# Patient Record
Sex: MALE | Race: WHITE | NOT HISPANIC OR LATINO | Employment: FULL TIME | URBAN - METROPOLITAN AREA
[De-identification: names, ages, dates, MRNs, and addresses within clinical notes are randomized per-mention and may not be internally consistent; named-entity substitution may affect disease eponyms.]

---

## 2017-01-03 ENCOUNTER — CLINICAL SUPPORT (OUTPATIENT)
Dept: FAMILY MEDICINE CLINIC | Facility: CLINIC | Age: 62
End: 2017-01-03

## 2017-01-03 DIAGNOSIS — I82.4Y9 DEEP VEIN THROMBOSIS (DVT) OF PROXIMAL LOWER EXTREMITY, UNSPECIFIED CHRONICITY, UNSPECIFIED LATERALITY (HCC): Primary | ICD-10-CM

## 2017-01-03 LAB — INR PPP: 2.8 (ref 0.9–1.1)

## 2017-01-03 PROCEDURE — 36416 COLLJ CAPILLARY BLOOD SPEC: CPT | Performed by: INTERNAL MEDICINE

## 2017-01-03 PROCEDURE — 85610 PROTHROMBIN TIME: CPT | Performed by: INTERNAL MEDICINE

## 2017-01-05 ENCOUNTER — TREATMENT (OUTPATIENT)
Dept: PHYSICAL THERAPY | Facility: CLINIC | Age: 62
End: 2017-01-05

## 2017-01-05 DIAGNOSIS — S83.282D TEAR OF LATERAL MENISCUS OF LEFT KNEE, CURRENT, UNSPECIFIED TEAR TYPE, SUBSEQUENT ENCOUNTER: ICD-10-CM

## 2017-01-05 DIAGNOSIS — S83.242D TEAR OF MEDIAL MENISCUS OF LEFT KNEE, CURRENT, UNSPECIFIED TEAR TYPE, SUBSEQUENT ENCOUNTER: Primary | ICD-10-CM

## 2017-01-05 DIAGNOSIS — Z98.890 S/P LEFT KNEE ARTHROSCOPY: ICD-10-CM

## 2017-01-05 PROCEDURE — 97161 PT EVAL LOW COMPLEX 20 MIN: CPT | Performed by: PHYSICAL THERAPIST

## 2017-01-05 PROCEDURE — 97110 THERAPEUTIC EXERCISES: CPT | Performed by: PHYSICAL THERAPIST

## 2017-01-05 NOTE — PATIENT INSTRUCTIONS
Access Code: [email or web address]   URL: [email or web address]   Date: 01/05/2017   Prepared by: Madina Pro     Exercises   Long Sitting Quad Set - 10 reps - 10 hold - 1x daily   Supine Heel Slide with Strap - 10 reps - 2 sets - 1x daily   Supine Hamstring Stretch with Strap - 3 reps - 20 hold - 1x daily   Supine Active Straight Leg Raise - 10 reps - 2 sets - 1x daily   Sidelying Hip Abduction - 10 reps - 2 sets - 1x daily

## 2017-01-05 NOTE — PROGRESS NOTES
Physical Therapy Initial Evaluation      Patient Name: Herb Jack       Patient MRN: [id number]  : 1955  Physician:Elijah Monet MD  Date: 2017    Encounter Diagnoses   Name Primary?   • Tear of medial meniscus of left knee, current, unspecified tear type, subsequent encounter Yes   • Tear of lateral meniscus of left knee, current, unspecified tear type, subsequent encounter      History: Left knee medial/lateral meniscus tears, s/p left knee arthroscopy with menisectomy and plica excision on 2016.  Crutches for a week or so, currently ambulating without AD.  Pain ranges from 1/10 at rest to 4/10 with stair climbing.    Objective Testing: See flowsheet    THERAPY ASSESSMENT:  Herb Jack is a pleasant 61 y.o. year old male who presents with signs and symptoms consistent with diagnosis.  Subjective outcome measure indicates 35% perceived disability.  Patient is a  good candidate for skilled PT services in order to address impairments and facilitate return to normal daily activities including ADL's, work and recreational activities.        Functional Limitations: Walking, Complaints of Pain, Decreased Strength, Decreased ROM, Decreased ability to perform ADL's   Length of Therapy: 1 month   PT Frequency: PT 2x week   PT Interventions: Therapeutic exercise - AROM, Therapeutic exercise - stretching, Therapeutic exercise - strengthening, Manual Therapy, Bracing/Taping, Soft Tissue mobilization, Hot packs/ Moist heat, Cold packs, Electrical stimulation, Balance Training, Home Exercise Program   Patient Agrees with Plan of Care: Yes    REHAB POTENTIAL: good            Short Term Goals: 4 weeks. Patient will:  1. Be independent with initial HEP  2. Be instructed in posture and body mechanics  3. Report pain of </= 2/10 with all daily activities  4. Demonstrate Left knee ROM 0-130 degrees for improved ADL's and stair climbing.  5. Demonstrate normal patellar mobility    Long Term  Goals: 6-8 weeks. Patient will:  1. Demonstrate improved Left lower extremity MMT of >/= 4+/5  2. Report pain of </= 1/10 with all daily activities  3. Ambulate with normal symmetrical gait  4. LEFS >/= 75/80  5. Be independent with long term HEP  6. Return to recreational activities including golf as desired.    Therapy Exercise 75391 25 minutes    Timed Code Treatment: 25   Minutes     Total Treatment Time: 60      Minutes    PT SIGNATURE: Madina Pro, PT   DATE TREATMENT INITIATED: 1/5/2017    Initial Certification Certification Period: 2/4/2017  I certify that the therapy services are furnished while this patient is under my care.  The services outlined above are required by this patient, and will be reviewed every 30 days.     PHYSICIAN: Elijah Monet MD      DATE:     Please sign and return via fax to [phone].. Thank you, [facility].

## 2017-01-09 ENCOUNTER — TREATMENT (OUTPATIENT)
Dept: PHYSICAL THERAPY | Facility: CLINIC | Age: 62
End: 2017-01-09

## 2017-01-09 DIAGNOSIS — Z98.890 S/P LEFT KNEE ARTHROSCOPY: ICD-10-CM

## 2017-01-09 DIAGNOSIS — S83.242D TEAR OF MEDIAL MENISCUS OF LEFT KNEE, CURRENT, UNSPECIFIED TEAR TYPE, SUBSEQUENT ENCOUNTER: Primary | ICD-10-CM

## 2017-01-09 DIAGNOSIS — S83.282D TEAR OF LATERAL MENISCUS OF LEFT KNEE, CURRENT, UNSPECIFIED TEAR TYPE, SUBSEQUENT ENCOUNTER: ICD-10-CM

## 2017-01-09 PROCEDURE — 97110 THERAPEUTIC EXERCISES: CPT | Performed by: PHYSICAL THERAPIST

## 2017-01-09 NOTE — PROGRESS NOTES
Daily Progress Note      Subjective   Knee felt good after first PT session.  Still with pain with stair climbing.    Pain Scale (0-10): 1/10      Objective          PROCEDURES AND MODALITIES:  Paraffin:    Moist Heat:    Ice: Rx Minutes: 10 mins  E-Stim:    Ultrasound:    Ionto:   Traction:        Therapy Exercise 35099 50 minutes     Timed Code Treatment: 50 Minutes  Total Treatment Time: 60 Minutes    Assessment/Plan   Tolerated new exercises well.  Mild pain with squat, otherwise pain free.  Progress per Plan of Care         Madina Pro, PT  Physical Therapist

## 2017-01-11 ENCOUNTER — TREATMENT (OUTPATIENT)
Dept: PHYSICAL THERAPY | Facility: CLINIC | Age: 62
End: 2017-01-11

## 2017-01-11 DIAGNOSIS — S83.242D TEAR OF MEDIAL MENISCUS OF LEFT KNEE, CURRENT, UNSPECIFIED TEAR TYPE, SUBSEQUENT ENCOUNTER: Primary | ICD-10-CM

## 2017-01-11 DIAGNOSIS — S83.282D TEAR OF LATERAL MENISCUS OF LEFT KNEE, CURRENT, UNSPECIFIED TEAR TYPE, SUBSEQUENT ENCOUNTER: ICD-10-CM

## 2017-01-11 DIAGNOSIS — Z98.890 S/P LEFT KNEE ARTHROSCOPY: ICD-10-CM

## 2017-01-11 PROCEDURE — 97110 THERAPEUTIC EXERCISES: CPT | Performed by: PHYSICAL THERAPIST

## 2017-01-11 NOTE — PROGRESS NOTES
Daily Progress Note      Subjective   Pt states he has been focusing more on heel strike while walking, and this has greatly improved his pain.    Pain Scale (0-10): 1/10      Objective          PROCEDURES AND MODALITIES:  Paraffin:    Moist Heat:    Ice: Rx Minutes: 10 mins  E-Stim:    Ultrasound:    Ionto:   Traction:        Therapy Exercise 93405 45 minutes     Timed Code Treatment: 45 Minutes  Total Treatment Time: 55 Minutes    Assessment/Plan   Tolerated new exercises well without pain.    Progress per Plan of Care         Madina Pro, PT  Physical Therapist

## 2017-01-16 ENCOUNTER — TREATMENT (OUTPATIENT)
Dept: PHYSICAL THERAPY | Facility: CLINIC | Age: 62
End: 2017-01-16

## 2017-01-16 DIAGNOSIS — S83.282D TEAR OF LATERAL MENISCUS OF LEFT KNEE, CURRENT, UNSPECIFIED TEAR TYPE, SUBSEQUENT ENCOUNTER: ICD-10-CM

## 2017-01-16 DIAGNOSIS — Z98.890 S/P LEFT KNEE ARTHROSCOPY: ICD-10-CM

## 2017-01-16 DIAGNOSIS — S83.242D TEAR OF MEDIAL MENISCUS OF LEFT KNEE, CURRENT, UNSPECIFIED TEAR TYPE, SUBSEQUENT ENCOUNTER: Primary | ICD-10-CM

## 2017-01-16 PROCEDURE — 97110 THERAPEUTIC EXERCISES: CPT | Performed by: PHYSICAL THERAPIST

## 2017-01-16 NOTE — PROGRESS NOTES
Daily Progress Note      Subjective   Knee is feeling good.  Asks if he can use his elliptical at home    Pain Scale (0-10): 1/10      Objective          PROCEDURES AND MODALITIES:  Paraffin:    Moist Heat:    Ice: Rx Minutes: 10 mins  E-Stim:    Ultrasound:    Ionto:   Traction:        Therapy Exercise 69836 50 minutes     Timed Code Treatment: 50 Minutes  Total Treatment Time: 60 Minutes    Assessment/Plan   Tolerated new exercises well.  Good control of knee on elliptical, OK to do this at home.  Progress per Plan of Care         Madina Pro, PT  Physical Therapist

## 2017-01-17 ENCOUNTER — CLINICAL SUPPORT (OUTPATIENT)
Dept: FAMILY MEDICINE CLINIC | Facility: CLINIC | Age: 62
End: 2017-01-17

## 2017-01-17 DIAGNOSIS — I82.409 THROMBOEMBOLISM OF DEEP VEINS OF LOWER EXTREMITY, UNSPECIFIED LATERALITY (HCC): Primary | ICD-10-CM

## 2017-01-17 LAB — INR PPP: 2.3 (ref 0.9–1.1)

## 2017-01-17 PROCEDURE — 36416 COLLJ CAPILLARY BLOOD SPEC: CPT | Performed by: INTERNAL MEDICINE

## 2017-01-17 PROCEDURE — 85610 PROTHROMBIN TIME: CPT | Performed by: INTERNAL MEDICINE

## 2017-01-17 NOTE — MR AVS SNAPSHOT
Herb Jack   1/17/2017 8:15 AM   Clinical Support    Dept Phone:  [phone]   Encounter #:  [id number]    Provider:  NURSELIZZIE PATEL   Department:  [facility] PRIMARY CARE                Your Full Care Plan              Your Updated Medication List          This list is accurate as of: 1/17/17  8:41 AM.  Always use your most recent med list.                * enoxaparin 100 MG/ML solution syringe   Commonly known as:  LOVENOX   Inject 0.3 mL under the skin Every 12 (Twelve) Hours.       * enoxaparin 30 MG/0.3ML solution syringe   Commonly known as:  LOVENOX   Inject 0.3 mL under the skin Every 12 (Twelve) Hours.       MULTI COMPLETE PO       omeprazole 40 MG capsule   Commonly known as:  priLOSEC   Take 1 capsule (40 mg total) by mouth daily.       oxyCODONE-acetaminophen 5-325 MG per tablet   Commonly known as:  PERCOCET   Take 1-2 tablets by mouth Every 4 (Four) Hours As Needed (pain). 1 tab for mild pain, 2 tabs for moderate pain       tadalafil 20 MG tablet   Commonly known as:  CIALIS       * warfarin 7.5 MG tablet   Commonly known as:  COUMADIN       * warfarin 5 MG tablet   Commonly known as:  COUMADIN       * Notice:  This list has 4 medication(s) that are the same as other medications prescribed for you. Read the directions carefully, and ask your doctor or other care provider to review them with you.            We Performed the Following     POC INR       You Were Diagnosed With        Codes Comments    Thromboembolism of deep veins of lower extremity, unspecified laterality    -  Primary ICD-10-CM: I82.409  ICD-9-CM: 453.40       Instructions     None    Patient Instructions History      Upcoming Appointments     Visit Type Date Time Department    NURSE/MA VISIT 1/17/2017  8:15 AM [facility]    COMM - FOLLOWUP PT 1/18/2017  7:00 AM [facility]    COMM - FOLLOWUP PT 1/23/2017  7:00 AM [facility] ESTPT    COMM - FOLLOWUP PT 1/30/2017   7:00 AM [facility] ESTPT    FOLLOW UP 2/1/2017  8:00 AM [facility]    COMM - FOLLOWUP PT 2/2/2017  7:00 AM [facility] ESTPT      MyChart Signup     Our records indicate that you have declined [facility] MyChart signup. If you would like to sign up for MyChart, please email [email or web address] or call [phone] to obtain an activation code.             Other Info from Your Visit           Your Appointments     Jan 18, 2017  7:00 AM EST   COMM FOLLOWUP PT with Madina Pro, PT   [facility] PHYSICAL THERAPY (--)    [address] [facility] [address]  [facility] [address] [phone]            Jan 23, 2017  7:00 AM EST   COMM FOLLOWUP PT with Madina Pro, PT   [facility] PHYSICAL [facility]ERAP[facility] (--)    [address] [facility] [address]  [facility] [address] [phone]            Jan 30, 2017  7:00 AM EST   COMM FOLLOWUP PT with Madina Pro, PT   [facility] (--)    [address]  [facility] [address] [phone]            Feb 01, 2017  8:00 AM EST   Follow Up with Elijah Monet MD   [facility] (--)    [address]  [place] [id number] [phone]           Arrive 15 minutes prior to appointment.            Feb 02, 2017  7:00 AM EST   COMM FOLLOWUP PT with Madina Pro, PT   [facility] (--)    [address]  [facility] [address] [phone]              Allergies     No Known Allergies      Vital Signs     Smoking Status                   Never Smoker           Problems and Diagnoses Noted     Blood clot in leg    -  Primary      Results

## 2017-01-18 ENCOUNTER — TREATMENT (OUTPATIENT)
Dept: PHYSICAL THERAPY | Facility: CLINIC | Age: 62
End: 2017-01-18

## 2017-01-18 DIAGNOSIS — Z98.890 S/P LEFT KNEE ARTHROSCOPY: ICD-10-CM

## 2017-01-18 DIAGNOSIS — S83.282D TEAR OF LATERAL MENISCUS OF LEFT KNEE, CURRENT, UNSPECIFIED TEAR TYPE, SUBSEQUENT ENCOUNTER: ICD-10-CM

## 2017-01-18 DIAGNOSIS — S83.242D TEAR OF MEDIAL MENISCUS OF LEFT KNEE, CURRENT, UNSPECIFIED TEAR TYPE, SUBSEQUENT ENCOUNTER: Primary | ICD-10-CM

## 2017-01-18 PROCEDURE — 97110 THERAPEUTIC EXERCISES: CPT | Performed by: PHYSICAL THERAPIST

## 2017-01-18 NOTE — PROGRESS NOTES
"Daily Progress Note      Subjective   Knee \"feels good\".  Has been using elliptical at home.  Stair climbing is much easier.    Pain Scale (0-10): 0/10      Objective          PROCEDURES AND MODALITIES:  Paraffin:    Moist Heat:    Ice: Rx Minutes: 10 mins  E-Stim:    Ultrasound:    Ionto:   Traction:        Therapy Exercise 19762 45 minutes     Timed Code Treatment: 45 Minutes  Total Treatment Time: 55 Minutes    Assessment/Plan   Quad and glut strength continues to improve.  Progress per Plan of Care         Madina Pro, PT  Physical Therapist        "

## 2017-01-23 ENCOUNTER — TREATMENT (OUTPATIENT)
Dept: PHYSICAL THERAPY | Facility: CLINIC | Age: 62
End: 2017-01-23

## 2017-01-23 DIAGNOSIS — S83.282D TEAR OF LATERAL MENISCUS OF LEFT KNEE, CURRENT, UNSPECIFIED TEAR TYPE, SUBSEQUENT ENCOUNTER: ICD-10-CM

## 2017-01-23 DIAGNOSIS — Z98.890 S/P LEFT KNEE ARTHROSCOPY: ICD-10-CM

## 2017-01-23 DIAGNOSIS — S83.242D TEAR OF MEDIAL MENISCUS OF LEFT KNEE, CURRENT, UNSPECIFIED TEAR TYPE, SUBSEQUENT ENCOUNTER: Primary | ICD-10-CM

## 2017-01-23 PROCEDURE — 97110 THERAPEUTIC EXERCISES: CPT | Performed by: PHYSICAL THERAPIST

## 2017-01-23 NOTE — PROGRESS NOTES
" Physical Therapy Daily Progress Note    Time In 0702  Time Out 0805    Herb Jack reports: was on his feet for hours at a time over the weekend, knee did \"pretty well\"    Subjective     Objective   See Exercise, Manual, and Modality Logs for complete treatment.       Assessment/Plan  Hip stability, quad strength continue to improve  Progress per Plan of Care           Manual Therapy:         mins  31296;  Therapeutic Exercise:    50     mins  81896;     Neuromuscular Ryan:        mins  70641;    Therapeutic Activity:          mins  93590;     Gait Training:           mins  17603;     Ultrasound:          mins  76396;    Electrical Stimulation:         mins  16570 ( );  Dry Needling          mins self-pay    Timed Treatment:   50   mins   Total Treatment:     60   mins    Madina Pro PT  Physical Therapist  [place] License #[id number]                "

## 2017-01-30 ENCOUNTER — TREATMENT (OUTPATIENT)
Dept: PHYSICAL THERAPY | Facility: CLINIC | Age: 62
End: 2017-01-30

## 2017-01-30 DIAGNOSIS — Z98.890 S/P LEFT KNEE ARTHROSCOPY: ICD-10-CM

## 2017-01-30 DIAGNOSIS — S83.282D TEAR OF LATERAL MENISCUS OF LEFT KNEE, CURRENT, UNSPECIFIED TEAR TYPE, SUBSEQUENT ENCOUNTER: ICD-10-CM

## 2017-01-30 DIAGNOSIS — S83.242D TEAR OF MEDIAL MENISCUS OF LEFT KNEE, CURRENT, UNSPECIFIED TEAR TYPE, SUBSEQUENT ENCOUNTER: Primary | ICD-10-CM

## 2017-01-30 PROCEDURE — 97110 THERAPEUTIC EXERCISES: CPT | Performed by: PHYSICAL THERAPIST

## 2017-01-30 RX ORDER — TADALAFIL 20 MG
TABLET ORAL
Qty: 3 TABLET | Refills: 1 | Status: SHIPPED | OUTPATIENT
Start: 2017-01-30 | End: 2017-05-09 | Stop reason: SDUPTHER

## 2017-01-30 NOTE — PROGRESS NOTES
" Physical Therapy Daily Progress Note    Subjective     Herb Jack reports: knee \"did well\" while playing golf.  Avoided a lot of rotation \"just to be cautious\"      Objective     Active Range of Motion   Left Knee   Flexion: 134 degrees   Extension: 0 degrees     Strength/Myotome Testing     Left Knee   Flexion: 5  Extension: 5  Quadriceps contraction: good     See Exercise, Manual, and Modality Logs for complete treatment.       Assessment/Plan  Pt requires cueing to correct quad dominant squat, but is able to perform squats without pain when form is corrected.  Progress per Plan of Care           Manual Therapy:    0     mins  65887;  Therapeutic Exercise:    50     mins  54387;     Neuromuscular Ryan:    0    mins  48328;    Therapeutic Activity:     0     mins  82953;     Gait Trainin     mins  44698;     Ultrasound:     0     mins  94073;    Electrical Stimulation:    0     mins  83937 ( );    Timed Treatment:   50   mins   Total Treatment:     60   mins    Madina Pro, PT  Physical Therapist  [place] License #[id number]                    "

## 2017-02-01 ENCOUNTER — OFFICE VISIT (OUTPATIENT)
Dept: ORTHOPEDIC SURGERY | Facility: CLINIC | Age: 62
End: 2017-02-01

## 2017-02-01 VITALS — BODY MASS INDEX: 33.32 KG/M2 | HEIGHT: 72 IN | WEIGHT: 246 LBS

## 2017-02-01 DIAGNOSIS — M25.512 CHRONIC LEFT SHOULDER PAIN: ICD-10-CM

## 2017-02-01 DIAGNOSIS — S83.242D TEAR OF MEDIAL MENISCUS OF LEFT KNEE, CURRENT, UNSPECIFIED TEAR TYPE, SUBSEQUENT ENCOUNTER: ICD-10-CM

## 2017-02-01 DIAGNOSIS — S83.282D TEAR OF LATERAL MENISCUS OF LEFT KNEE, CURRENT, UNSPECIFIED TEAR TYPE, SUBSEQUENT ENCOUNTER: Primary | ICD-10-CM

## 2017-02-01 DIAGNOSIS — G89.29 CHRONIC LEFT SHOULDER PAIN: ICD-10-CM

## 2017-02-01 PROCEDURE — 99024 POSTOP FOLLOW-UP VISIT: CPT | Performed by: ORTHOPAEDIC SURGERY

## 2017-02-01 NOTE — PROGRESS NOTES
"Knee Exam      Patient: Herb Jack    YOB: 1955 61 y.o. male    Chief Complaints: knee doing well    History of Present Illness: Follows up left knee scope with medial and lateral meniscal debridement from 12/13/16.  He is finishing physical therapy and overall feels like the knee is much much better not having any further mechanical symptoms and really no significant pain.  He played golf 4 days in a row last week.  He reports an injury to his left shoulder about a year ago putting some luggage up into a overhead bin.  He saw his primary care physician and responded well with physical therapy since he is completed physical therapy has not been doing home exercises he reports some mild intermittent achiness and stiffness in the shoulder and limitation on motion of internal rotation.  HPI    ROS: Shoulder pain  Past Medical History   Diagnosis Date   • Anticoagulated      WARFARIN STOPPED 12-8-16   • DVT (deep venous thrombosis)      LEFT LEG 2 YRS AGO AFTER TRAVELING   • Factor 5 Leiden mutation, heterozygous        Physical Exam:   Vitals:    02/01/17 0819   Weight: 246 lb (112 kg)   Height: 72\" (182.9 cm)     Well developed with normal mood.  Nonantalgic gait.  Left knee incisions are well healed no significant effusion warmth or erythema full extension 115° of flexion.  Calf and thigh are nontender without sign of DVT.  No pain with Tito's.  Left shoulder shows symmetric forward flexion and abduction with 5 out of 5 rotator cuff strength mildly limited internal rotation to midline of lumbar spine compared with just passed lumbar spine on the contralateral side.      Radiology: None performed      Assessment/Plan: Status post left knee scope with meniscal debridement I again reviewed operative pictures with him in detail he will continue with home physical therapy exercises for this and avoid jumping and impact activity or repetitive twisting.  He may play golf in elliptical and may " advance activities slowly as tolerated and I expect he should be able to go skiing next year.  He told me how much he appreciates my care and I'll see him back as needed.  2.  Regarding his left shoulder does have some mild limitation on internal rotation but no obvious evidence of rotator cuff tear today.  We decided to hold off on x-rays today.  As he improved with physical therapy in the past he will resume his home physical therapy exercises for this if it is not improved he will let me know and we'll get an MRI of the shoulder specialists.

## 2017-02-02 ENCOUNTER — TREATMENT (OUTPATIENT)
Dept: PHYSICAL THERAPY | Facility: CLINIC | Age: 62
End: 2017-02-02

## 2017-02-02 DIAGNOSIS — S83.242D TEAR OF MEDIAL MENISCUS OF LEFT KNEE, CURRENT, UNSPECIFIED TEAR TYPE, SUBSEQUENT ENCOUNTER: Primary | ICD-10-CM

## 2017-02-02 DIAGNOSIS — S83.282D TEAR OF LATERAL MENISCUS OF LEFT KNEE, CURRENT, UNSPECIFIED TEAR TYPE, SUBSEQUENT ENCOUNTER: ICD-10-CM

## 2017-02-02 DIAGNOSIS — Z98.890 S/P LEFT KNEE ARTHROSCOPY: ICD-10-CM

## 2017-02-02 PROCEDURE — 97110 THERAPEUTIC EXERCISES: CPT | Performed by: PHYSICAL THERAPIST

## 2017-02-02 NOTE — PATIENT INSTRUCTIONS
Access Code: [email or web address]   URL: [email or web address]   Date: 02/02/2017   Prepared by: Madina Pro     Exercises   Long Sitting Quad Set - 10 reps - 10 hold - 1x daily   Supine Heel Slide with Strap - 10 reps - 2 sets - 1x daily   Supine Hamstring Stretch with Strap - 3 reps - 20 hold - 1x daily   Supine Active Straight Leg Raise - 10 reps - 2 sets - 1x daily   Sidelying Hip Abduction - 10 reps - 2 sets - 1x daily   Forward Backward Monster Walk with Band at Thighs and Counter Support - 10 reps - 2 sets - 3x weekly   Sidestepping in Squat with Resistance and Arms Forward - 10 reps - 2 sets - 3x weekly   Step Up - 10 reps - 2 sets - 1x daily   Lateral Step Up - 10 reps - 2 sets - 3x weekly   Squat - 10 reps - 2 sets - 3x weekly

## 2017-02-07 ENCOUNTER — CLINICAL SUPPORT (OUTPATIENT)
Dept: FAMILY MEDICINE CLINIC | Facility: CLINIC | Age: 62
End: 2017-02-07

## 2017-02-07 DIAGNOSIS — I82.409 THROMBOEMBOLISM OF DEEP VEINS OF LOWER EXTREMITY, UNSPECIFIED LATERALITY (HCC): Primary | ICD-10-CM

## 2017-02-07 LAB — INR PPP: 2.2 (ref 0.9–1.1)

## 2017-02-07 PROCEDURE — 36416 COLLJ CAPILLARY BLOOD SPEC: CPT | Performed by: INTERNAL MEDICINE

## 2017-02-07 PROCEDURE — 85610 PROTHROMBIN TIME: CPT | Performed by: FAMILY MEDICINE

## 2017-03-07 DIAGNOSIS — I82.4Z9 DEEP VEIN THROMBOSIS (DVT) OF DISTAL VEIN OF LOWER EXTREMITY, UNSPECIFIED CHRONICITY, UNSPECIFIED LATERALITY (HCC): Primary | ICD-10-CM

## 2017-03-07 LAB — INR PPP: 2 (ref 0.9–1.1)

## 2017-03-07 PROCEDURE — 36416 COLLJ CAPILLARY BLOOD SPEC: CPT | Performed by: INTERNAL MEDICINE

## 2017-03-07 PROCEDURE — 85610 PROTHROMBIN TIME: CPT | Performed by: INTERNAL MEDICINE

## 2017-04-05 ENCOUNTER — CLINICAL SUPPORT (OUTPATIENT)
Dept: FAMILY MEDICINE CLINIC | Facility: CLINIC | Age: 62
End: 2017-04-05

## 2017-04-05 DIAGNOSIS — I82.90 DEEP VEIN THROMBOSIS (DVT) OF NON-EXTREMITY VEIN, UNSPECIFIED CHRONICITY: Primary | ICD-10-CM

## 2017-04-05 LAB — INR PPP: 1.7 (ref 0.9–1.1)

## 2017-04-05 PROCEDURE — 36416 COLLJ CAPILLARY BLOOD SPEC: CPT | Performed by: INTERNAL MEDICINE

## 2017-04-05 PROCEDURE — 85610 PROTHROMBIN TIME: CPT | Performed by: INTERNAL MEDICINE

## 2017-04-12 ENCOUNTER — CLINICAL SUPPORT (OUTPATIENT)
Dept: FAMILY MEDICINE CLINIC | Facility: CLINIC | Age: 62
End: 2017-04-12

## 2017-04-12 DIAGNOSIS — D68.51 FACTOR V LEIDEN (HCC): Primary | ICD-10-CM

## 2017-04-12 DIAGNOSIS — I48.91 ATRIAL FIBRILLATION, UNSPECIFIED TYPE (HCC): Primary | ICD-10-CM

## 2017-04-12 LAB
INR PPP: 1.8 (ref 0.9–1.1)
INR PPP: 1.8 (ref 0.9–1.1)

## 2017-04-12 PROCEDURE — 85610 PROTHROMBIN TIME: CPT | Performed by: INTERNAL MEDICINE

## 2017-04-12 PROCEDURE — 36416 COLLJ CAPILLARY BLOOD SPEC: CPT | Performed by: INTERNAL MEDICINE

## 2017-04-26 DIAGNOSIS — I82.4Z9 DEEP VEIN THROMBOSIS (DVT) OF DISTAL VEIN OF LOWER EXTREMITY, UNSPECIFIED CHRONICITY, UNSPECIFIED LATERALITY (HCC): Primary | ICD-10-CM

## 2017-04-26 LAB — INR PPP: 3.4 (ref 0.9–1.1)

## 2017-04-26 PROCEDURE — 36416 COLLJ CAPILLARY BLOOD SPEC: CPT | Performed by: INTERNAL MEDICINE

## 2017-04-26 PROCEDURE — 85610 PROTHROMBIN TIME: CPT | Performed by: INTERNAL MEDICINE

## 2017-04-26 RX ORDER — WARFARIN SODIUM 5 MG/1
5 TABLET ORAL
Start: 2017-04-26 | End: 2017-08-15 | Stop reason: SDUPTHER

## 2017-05-04 DIAGNOSIS — I82.4Z9 DEEP VEIN THROMBOSIS (DVT) OF DISTAL VEIN OF LOWER EXTREMITY, UNSPECIFIED CHRONICITY, UNSPECIFIED LATERALITY (HCC): Primary | ICD-10-CM

## 2017-05-04 LAB — INR PPP: 2.2 (ref 0.9–1.1)

## 2017-05-04 PROCEDURE — 36416 COLLJ CAPILLARY BLOOD SPEC: CPT | Performed by: INTERNAL MEDICINE

## 2017-05-04 PROCEDURE — 85610 PROTHROMBIN TIME: CPT | Performed by: INTERNAL MEDICINE

## 2017-05-10 RX ORDER — TADALAFIL 20 MG
TABLET ORAL
Qty: 3 TABLET | Refills: 0 | Status: SHIPPED | OUTPATIENT
Start: 2017-05-10 | End: 2017-08-10 | Stop reason: SDUPTHER

## 2017-08-10 ENCOUNTER — OFFICE VISIT (OUTPATIENT)
Dept: FAMILY MEDICINE CLINIC | Facility: CLINIC | Age: 62
End: 2017-08-10

## 2017-08-10 VITALS
HEIGHT: 72 IN | SYSTOLIC BLOOD PRESSURE: 124 MMHG | TEMPERATURE: 97.7 F | OXYGEN SATURATION: 96 % | WEIGHT: 245.1 LBS | RESPIRATION RATE: 16 BRPM | HEART RATE: 55 BPM | DIASTOLIC BLOOD PRESSURE: 78 MMHG | BODY MASS INDEX: 33.2 KG/M2

## 2017-08-10 DIAGNOSIS — I82.409 DEEP VEIN THROMBOSIS (DVT) OF LOWER EXTREMITY, UNSPECIFIED CHRONICITY, UNSPECIFIED LATERALITY, UNSPECIFIED VEIN (HCC): Primary | ICD-10-CM

## 2017-08-10 DIAGNOSIS — G89.29 CHRONIC LEFT SHOULDER PAIN: ICD-10-CM

## 2017-08-10 DIAGNOSIS — Z12.5 PROSTATE CANCER SCREENING: ICD-10-CM

## 2017-08-10 DIAGNOSIS — M25.512 CHRONIC LEFT SHOULDER PAIN: ICD-10-CM

## 2017-08-10 DIAGNOSIS — Z00.00 HEALTH CARE MAINTENANCE: Primary | ICD-10-CM

## 2017-08-10 LAB
ALBUMIN SERPL-MCNC: 4.2 G/DL (ref 3.5–5.2)
ALBUMIN/GLOB SERPL: 1.4 G/DL
ALP SERPL-CCNC: 52 U/L (ref 39–117)
ALT SERPL-CCNC: 23 U/L (ref 1–41)
AST SERPL-CCNC: 24 U/L (ref 1–40)
BASOPHILS # BLD AUTO: 0.05 10*3/MM3 (ref 0–0.2)
BASOPHILS NFR BLD AUTO: 0.8 % (ref 0–1.5)
BILIRUB SERPL-MCNC: 0.4 MG/DL (ref 0.1–1.2)
BUN SERPL-MCNC: 13 MG/DL (ref 8–23)
BUN/CREAT SERPL: 12.9 (ref 7–25)
CALCIUM SERPL-MCNC: 9.9 MG/DL (ref 8.6–10.5)
CHLORIDE SERPL-SCNC: 105 MMOL/L (ref 98–107)
CHOLEST SERPL-MCNC: 269 MG/DL (ref 0–200)
CO2 SERPL-SCNC: 27.6 MMOL/L (ref 22–29)
CREAT SERPL-MCNC: 1.01 MG/DL (ref 0.76–1.27)
EOSINOPHIL # BLD AUTO: 0.32 10*3/MM3 (ref 0–0.7)
EOSINOPHIL NFR BLD AUTO: 5.4 % (ref 0.3–6.2)
ERYTHROCYTE [DISTWIDTH] IN BLOOD BY AUTOMATED COUNT: 14.1 % (ref 11.5–14.5)
GLOBULIN SER CALC-MCNC: 3 GM/DL
GLUCOSE SERPL-MCNC: 107 MG/DL (ref 65–99)
HCT VFR BLD AUTO: 47.8 % (ref 40.4–52.2)
HDLC SERPL-MCNC: 51 MG/DL (ref 40–60)
HGB BLD-MCNC: 15.5 G/DL (ref 13.7–17.6)
IMM GRANULOCYTES # BLD: 0 10*3/MM3 (ref 0–0.03)
IMM GRANULOCYTES NFR BLD: 0 % (ref 0–0.5)
INR PPP: 2.2 (ref 0.9–1.1)
LDLC SERPL CALC-MCNC: 176 MG/DL (ref 0–100)
LYMPHOCYTES # BLD AUTO: 1.94 10*3/MM3 (ref 0.9–4.8)
LYMPHOCYTES NFR BLD AUTO: 32.8 % (ref 19.6–45.3)
MCH RBC QN AUTO: 31.5 PG (ref 27–32.7)
MCHC RBC AUTO-ENTMCNC: 32.4 G/DL (ref 32.6–36.4)
MCV RBC AUTO: 97.2 FL (ref 79.8–96.2)
MONOCYTES # BLD AUTO: 0.36 10*3/MM3 (ref 0.2–1.2)
MONOCYTES NFR BLD AUTO: 6.1 % (ref 5–12)
NEUTROPHILS # BLD AUTO: 3.25 10*3/MM3 (ref 1.9–8.1)
NEUTROPHILS NFR BLD AUTO: 54.9 % (ref 42.7–76)
PLATELET # BLD AUTO: 213 10*3/MM3 (ref 140–500)
POTASSIUM SERPL-SCNC: 4.4 MMOL/L (ref 3.5–5.2)
PROT SERPL-MCNC: 7.2 G/DL (ref 6–8.5)
PSA SERPL-MCNC: 2.1 NG/ML (ref 0–4)
RBC # BLD AUTO: 4.92 10*6/MM3 (ref 4.6–6)
SODIUM SERPL-SCNC: 143 MMOL/L (ref 136–145)
T4 FREE SERPL-MCNC: 0.98 NG/DL (ref 0.93–1.7)
TRIGL SERPL-MCNC: 209 MG/DL (ref 0–150)
TSH SERPL DL<=0.005 MIU/L-ACNC: 2.72 MIU/ML (ref 0.27–4.2)
VLDLC SERPL CALC-MCNC: 41.8 MG/DL (ref 5–40)
WBC # BLD AUTO: 5.92 10*3/MM3 (ref 4.5–10.7)

## 2017-08-10 PROCEDURE — 99396 PREV VISIT EST AGE 40-64: CPT | Performed by: INTERNAL MEDICINE

## 2017-08-10 PROCEDURE — 36416 COLLJ CAPILLARY BLOOD SPEC: CPT | Performed by: INTERNAL MEDICINE

## 2017-08-10 PROCEDURE — 85610 PROTHROMBIN TIME: CPT | Performed by: INTERNAL MEDICINE

## 2017-08-10 RX ORDER — TADALAFIL 20 MG/1
20 TABLET ORAL DAILY PRN
Qty: 3 TABLET | Refills: 11 | Status: SHIPPED | OUTPATIENT
Start: 2017-08-10 | End: 2018-01-08 | Stop reason: SDUPTHER

## 2017-08-10 NOTE — PROGRESS NOTES
"Subjective   Patient ID: Herb Jack is a 62 y.o. male presents with   Chief Complaint   Patient presents with   • Annual Exam     inr, pt is fasting for labs       HPI - This patient presents today for yearly visit.  He has factor V Leiden mutation and has had a DVT he's on chronic warfarin and his INR today is therapeutic.  He also complains of some erectile dysfunction he's been on Cialis he needs a refill.  It's time for colon cancer screening since he's on warfarin he wants to do an alternative form of colon cancer screening.  Otherwise he's feeling well I counseled him on increasing his exercise and losing some weight and he just recently had a knee surgery.    Assessment plan    Health care maintenance-recommend 10 pounds of weight loss exercise and diet.  We'll get routine labs    Chronic left shoulder pain he's going to do his physical therapy exercises    Prostate cancer screening-PSA    Colon cancer screening-patient chooses cologuard    No Known Allergies    The following portions of the patient's history were reviewed and updated as appropriate: allergies, current medications, past family history, past medical history, past social history, past surgical history and problem list.      Review of Systems   Constitutional: Negative.    HENT: Negative.    Eyes: Negative.    Respiratory: Negative.    Cardiovascular: Negative.    Gastrointestinal: Negative.    Endocrine: Negative.    Genitourinary: Negative.    Musculoskeletal: Positive for arthralgias.   Skin: Negative.    Allergic/Immunologic: Negative.    Neurological: Negative.    Hematological: Negative.    Psychiatric/Behavioral: Negative.        Objective     Vitals:    08/10/17 0808   BP: 124/78   Pulse: 55   Resp: 16   Temp: 97.7 °F (36.5 °C)   TempSrc: Oral   SpO2: 96%   Weight: 245 lb 1.6 oz (111 kg)   Height: 72\" (182.9 cm)         Physical Exam   Constitutional: He is oriented to person, place, and time. He appears well-developed and " well-nourished. No distress.   HENT:   Head: Normocephalic and atraumatic.   Eyes: Conjunctivae and EOM are normal. Pupils are equal, round, and reactive to light. Right eye exhibits no discharge. Left eye exhibits no discharge. No scleral icterus.   Neck: Normal range of motion. Neck supple. No tracheal deviation present. No thyromegaly present.   Cardiovascular: Normal rate, regular rhythm, normal heart sounds, intact distal pulses and normal pulses.  Exam reveals no gallop.    No murmur heard.  Pulmonary/Chest: Effort normal and breath sounds normal. No respiratory distress. He has no wheezes. He has no rales.   Abdominal: Soft. There is no tenderness.   Musculoskeletal: Normal range of motion.   Neurological: He is alert and oriented to person, place, and time. He exhibits normal muscle tone. Coordination normal.   Skin: Skin is warm. No rash noted. No erythema. No pallor.   Psychiatric: He has a normal mood and affect. His behavior is normal. Judgment and thought content normal.   Nursing note and vitals reviewed.        Herb was seen today for annual exam.    Diagnoses and all orders for this visit:    Health care maintenance  -     PSA  -     Lipid Panel  -     CBC & Differential  -     Comprehensive Metabolic Panel  -     TSH+Free T4    Chronic left shoulder pain  -     PSA  -     Lipid Panel  -     CBC & Differential  -     Comprehensive Metabolic Panel  -     TSH+Free T4    Prostate cancer screening  -     PSA  -     Lipid Panel  -     CBC & Differential  -     Comprehensive Metabolic Panel  -     TSH+Free T4    Other orders  -     tadalafil (CIALIS) 20 MG tablet; Take 1 tablet by mouth Daily As Needed for erectile dysfunction.        Call or return to clinic prn if these symptoms worsen or fail to improve as anticipated.

## 2017-08-16 RX ORDER — WARFARIN SODIUM 5 MG/1
TABLET ORAL
Qty: 60 TABLET | Refills: 3 | Status: SHIPPED | OUTPATIENT
Start: 2017-08-16 | End: 2018-02-24 | Stop reason: SDUPTHER

## 2018-01-08 RX ORDER — TADALAFIL 20 MG/1
20 TABLET ORAL DAILY PRN
Qty: 3 TABLET | Refills: 11 | Status: SHIPPED | OUTPATIENT
Start: 2018-01-08 | End: 2018-08-22 | Stop reason: SDUPTHER

## 2018-01-09 ENCOUNTER — CLINICAL SUPPORT (OUTPATIENT)
Dept: FAMILY MEDICINE CLINIC | Facility: CLINIC | Age: 63
End: 2018-01-09

## 2018-01-09 DIAGNOSIS — I82.90 DEEP VEIN THROMBOSIS (DVT) OF NON-EXTREMITY VEIN, UNSPECIFIED CHRONICITY: Primary | ICD-10-CM

## 2018-01-09 LAB — INR PPP: 2.5 (ref 0.9–1.1)

## 2018-01-09 PROCEDURE — 36416 COLLJ CAPILLARY BLOOD SPEC: CPT | Performed by: INTERNAL MEDICINE

## 2018-01-09 PROCEDURE — 85610 PROTHROMBIN TIME: CPT | Performed by: INTERNAL MEDICINE

## 2018-01-10 ENCOUNTER — TELEPHONE (OUTPATIENT)
Dept: FAMILY MEDICINE CLINIC | Facility: CLINIC | Age: 63
End: 2018-01-10

## 2018-01-10 RX ORDER — SILDENAFIL 25 MG/1
25 TABLET, FILM COATED ORAL DAILY PRN
Qty: 3 TABLET | Refills: 2 | Status: SHIPPED | OUTPATIENT
Start: 2018-01-10 | End: 2018-11-07

## 2018-02-26 RX ORDER — WARFARIN SODIUM 5 MG/1
TABLET ORAL
Qty: 60 TABLET | Refills: 2 | Status: SHIPPED | OUTPATIENT
Start: 2018-02-26 | End: 2018-08-22 | Stop reason: SDUPTHER

## 2018-04-24 ENCOUNTER — CLINICAL SUPPORT (OUTPATIENT)
Dept: FAMILY MEDICINE CLINIC | Facility: CLINIC | Age: 63
End: 2018-04-24

## 2018-04-24 DIAGNOSIS — I82.401 ACUTE DEEP VEIN THROMBOSIS (DVT) OF RIGHT LOWER EXTREMITY, UNSPECIFIED VEIN (HCC): Primary | ICD-10-CM

## 2018-04-24 LAB — INR PPP: 2.7 (ref 2–3)

## 2018-04-24 PROCEDURE — 85610 PROTHROMBIN TIME: CPT | Performed by: INTERNAL MEDICINE

## 2018-04-24 PROCEDURE — 36416 COLLJ CAPILLARY BLOOD SPEC: CPT | Performed by: INTERNAL MEDICINE

## 2018-06-28 RX ORDER — WARFARIN SODIUM 7.5 MG/1
7.5 TABLET ORAL NIGHTLY
Qty: 30 TABLET | Refills: 1 | Status: SHIPPED | OUTPATIENT
Start: 2018-06-28 | End: 2019-07-16

## 2018-08-22 ENCOUNTER — OFFICE VISIT (OUTPATIENT)
Dept: FAMILY MEDICINE CLINIC | Facility: CLINIC | Age: 63
End: 2018-08-22

## 2018-08-22 VITALS
WEIGHT: 239.4 LBS | HEART RATE: 72 BPM | TEMPERATURE: 98 F | HEIGHT: 72 IN | BODY MASS INDEX: 32.43 KG/M2 | SYSTOLIC BLOOD PRESSURE: 118 MMHG | DIASTOLIC BLOOD PRESSURE: 72 MMHG | OXYGEN SATURATION: 98 %

## 2018-08-22 DIAGNOSIS — I82.4Y9 DEEP VEIN THROMBOSIS (DVT) OF PROXIMAL LOWER EXTREMITY, UNSPECIFIED CHRONICITY, UNSPECIFIED LATERALITY (HCC): ICD-10-CM

## 2018-08-22 DIAGNOSIS — Z12.5 SCREENING FOR PROSTATE CANCER: ICD-10-CM

## 2018-08-22 DIAGNOSIS — Z13.29 SCREENING FOR THYROID DISORDER: ICD-10-CM

## 2018-08-22 DIAGNOSIS — Z79.899 MEDICATION MANAGEMENT: ICD-10-CM

## 2018-08-22 DIAGNOSIS — Z13.220 SCREENING FOR CHOLESTEROL LEVEL: ICD-10-CM

## 2018-08-22 DIAGNOSIS — I82.90 DEEP VEIN THROMBOSIS (DVT) OF NON-EXTREMITY VEIN, UNSPECIFIED CHRONICITY: Primary | ICD-10-CM

## 2018-08-22 LAB — INR PPP: 2.7 (ref 0.9–1.1)

## 2018-08-22 PROCEDURE — 36416 COLLJ CAPILLARY BLOOD SPEC: CPT | Performed by: NURSE PRACTITIONER

## 2018-08-22 PROCEDURE — 99214 OFFICE O/P EST MOD 30 MIN: CPT | Performed by: NURSE PRACTITIONER

## 2018-08-22 PROCEDURE — 85610 PROTHROMBIN TIME: CPT | Performed by: NURSE PRACTITIONER

## 2018-08-22 RX ORDER — TADALAFIL 20 MG/1
20 TABLET ORAL DAILY PRN
Qty: 3 TABLET | Refills: 11 | Status: SHIPPED | OUTPATIENT
Start: 2018-08-22 | End: 2018-11-07

## 2018-08-22 RX ORDER — WARFARIN SODIUM 5 MG/1
10 TABLET ORAL DAILY
Qty: 60 TABLET | Refills: 5 | Status: SHIPPED | OUTPATIENT
Start: 2018-08-22 | End: 2019-06-28 | Stop reason: SDUPTHER

## 2018-08-22 RX ORDER — SILDENAFIL CITRATE 20 MG/1
TABLET ORAL
Qty: 30 TABLET | Refills: 0 | Status: SHIPPED | OUTPATIENT
Start: 2018-08-22 | End: 2018-08-29 | Stop reason: SDUPTHER

## 2018-08-22 NOTE — PROGRESS NOTES
Subjective   Herb Jack is a 63 y.o. male presents to establish care and for medication refills. Previously established with Dr. Betancur. Has a history of DVT, with factor V disorder. Taking coumadin, INR therapeutic, usually manages well with diet, last INR 2.7. Denies any swelling in legs, no shortness of breath. No signs or symptoms of bleeding.     Also requests refill on cialis. Has tried viagra with less results. Agreeable to try again.    Non fasting today, but agreeable to return for labs in 3 months with repeat INR.    He reports increased stress recently. 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     Hx of blood clots with factor V clotting disorder  On coumadin, therapeutic dose 2-3  Monitors diet, eats primarily vegetarian or pescatarian diet  Non fasting, agreeable to return for fasting labs    Discussed ED, taking cialis, agreeable to try sildenafil         The following portions of the patient's history were reviewed and updated as appropriate: allergies, current medications, past family history, past medical history, past social history, past surgical history and problem list.    Review of Systems   Constitutional: Negative.    HENT: Negative.    Eyes: Negative.    Respiratory: Negative.    Cardiovascular: Negative.    Gastrointestinal: Negative.    Endocrine: Negative.    Genitourinary: Negative.    Musculoskeletal: Negative.    Skin: Negative.    Allergic/Immunologic: Negative.    Neurological: Negative.    Hematological: Negative.    Psychiatric/Behavioral: Negative.        Objective   Physical Exam   Constitutional: He is oriented to person, place, and time. He appears well-developed and well-nourished.   HENT:   Head: Normocephalic and atraumatic.   Right Ear: Tympanic membrane, external ear and ear canal normal.   Left Ear: Tympanic membrane, external ear and ear canal normal.   Nose: Nose normal.   Mouth/Throat: Uvula is midline, oropharynx is clear and moist and mucous membranes are normal. No  oropharyngeal exudate.   Eyes: Pupils are equal, round, and reactive to light. Conjunctivae are normal.   Neck: Neck supple.   Cardiovascular: Normal rate, regular rhythm and normal heart sounds.  Exam reveals no gallop and no friction rub.    No murmur heard.  Pulmonary/Chest: Effort normal and breath sounds normal. No respiratory distress. He has no wheezes. He has no rales.   Abdominal: Soft. Bowel sounds are normal. He exhibits no distension. There is no tenderness.   Neurological: He is alert and oriented to person, place, and time.   Skin: Skin is warm and dry.   Psychiatric: He has a normal mood and affect.   Vitals reviewed.      Assessment/Plan   Herb was seen today for coagulation disorder.    Diagnoses and all orders for this visit:    Deep vein thrombosis (DVT) of non-extremity vein, unspecified chronicity    Deep vein thrombosis (DVT) of proximal lower extremity, unspecified chronicity, unspecified laterality (CMS/HCC)  -     POCT INR    Other orders  -     warfarin (COUMADIN) 5 MG tablet; Take 2 tablets by mouth Daily.  -     tadalafil (CIALIS) 20 MG tablet; Take 1 tablet by mouth Daily As Needed for erectile dysfunction.  -     sildenafil (REVATIO) 20 MG tablet; Take up to 3 tabs po daily as directed

## 2018-08-27 ENCOUNTER — RESULTS ENCOUNTER (OUTPATIENT)
Dept: FAMILY MEDICINE CLINIC | Facility: CLINIC | Age: 63
End: 2018-08-27

## 2018-08-27 DIAGNOSIS — Z79.899 MEDICATION MANAGEMENT: ICD-10-CM

## 2018-08-27 DIAGNOSIS — Z12.5 SCREENING FOR PROSTATE CANCER: ICD-10-CM

## 2018-08-27 DIAGNOSIS — Z13.29 SCREENING FOR THYROID DISORDER: ICD-10-CM

## 2018-08-27 DIAGNOSIS — I82.90 DEEP VEIN THROMBOSIS (DVT) OF NON-EXTREMITY VEIN, UNSPECIFIED CHRONICITY: ICD-10-CM

## 2018-08-27 DIAGNOSIS — Z13.220 SCREENING FOR CHOLESTEROL LEVEL: ICD-10-CM

## 2018-08-29 RX ORDER — SILDENAFIL CITRATE 20 MG/1
TABLET ORAL
Qty: 30 TABLET | Refills: 0 | Status: SHIPPED | OUTPATIENT
Start: 2018-08-29 | End: 2018-09-10 | Stop reason: SDUPTHER

## 2018-09-10 RX ORDER — SILDENAFIL CITRATE 20 MG/1
TABLET ORAL
Qty: 30 TABLET | Refills: 0 | Status: SHIPPED | OUTPATIENT
Start: 2018-09-10 | End: 2018-10-17 | Stop reason: SDUPTHER

## 2018-10-17 RX ORDER — SILDENAFIL CITRATE 20 MG/1
TABLET ORAL
Qty: 30 TABLET | Refills: 0 | Status: SHIPPED | OUTPATIENT
Start: 2018-10-17 | End: 2020-05-26

## 2018-11-07 ENCOUNTER — OFFICE VISIT (OUTPATIENT)
Dept: FAMILY MEDICINE CLINIC | Facility: CLINIC | Age: 63
End: 2018-11-07

## 2018-11-07 VITALS
SYSTOLIC BLOOD PRESSURE: 128 MMHG | OXYGEN SATURATION: 98 % | HEART RATE: 55 BPM | BODY MASS INDEX: 32.51 KG/M2 | TEMPERATURE: 98.4 F | DIASTOLIC BLOOD PRESSURE: 82 MMHG | HEIGHT: 72 IN | WEIGHT: 240 LBS

## 2018-11-07 DIAGNOSIS — I82.532 CHRONIC DEEP VEIN THROMBOSIS (DVT) OF POPLITEAL VEIN OF LEFT LOWER EXTREMITY (HCC): ICD-10-CM

## 2018-11-07 DIAGNOSIS — E55.9 HYPOVITAMINOSIS D: ICD-10-CM

## 2018-11-07 DIAGNOSIS — Z13.1 SCREENING FOR DIABETES MELLITUS: ICD-10-CM

## 2018-11-07 DIAGNOSIS — G56.21 CUBITAL TUNNEL SYNDROME ON RIGHT: ICD-10-CM

## 2018-11-07 DIAGNOSIS — Z12.5 PROSTATE CANCER SCREENING: ICD-10-CM

## 2018-11-07 DIAGNOSIS — E78.49 OTHER HYPERLIPIDEMIA: Primary | ICD-10-CM

## 2018-11-07 PROBLEM — Z00.00 HEALTH CARE MAINTENANCE: Status: RESOLVED | Noted: 2017-08-10 | Resolved: 2018-11-07

## 2018-11-07 PROBLEM — N52.8 OTHER MALE ERECTILE DYSFUNCTION: Status: ACTIVE | Noted: 2018-11-07

## 2018-11-07 PROCEDURE — 99214 OFFICE O/P EST MOD 30 MIN: CPT | Performed by: FAMILY MEDICINE

## 2018-11-07 NOTE — PROGRESS NOTES
Herb Jack is a 63 y.o. male.     Chief Complaint   Patient presents with   • Establish Care     new pt establishing today in office    • Arm Pain     right arm ,pain stifness for a month ago cant lift heavy stuff       HPI     Pt is a pleasant 63 y.o. YO male here for arm pain and anticoagulation.  He is a new patient to me and this office.   PMH includes Factor V Leiden def with 2 DVTs on chronic anticoagulation with INR goal 2-3, ED, HLD not well controlled, GERD with EGD and improved with 40 lbs of weight loss.      Chronic anticoagulation: started 3 years ago after flying, had a DVT was subsequently found to have factor V Leiden deficiency.  Had a second event after funning and now on lifetime anticoagulation.  He has difficulty with running, he gets some edema that limits exercise. He is on Warfarin, 5 mg daily except 7.5 Sun Thurs. Tolerating well, INR stable.      Right elbow pain present for some months after lifting a heavy object.  It is on the lateral aspect and seems to be present most of the time, it is chronic, aching.  Not tried ice or medication.  Unable to take oral NSAIDs with anti-coagulation.    L knee with OA and had a surgery that helped with chondroplasty.      HLD: muscle aches with statin     The following portions of the patient's history were reviewed and updated as appropriate: allergies, current medications, past family history, past medical history, past social history, past surgical history and problem list.    Review of Systems   Constitutional: Negative for fever and unexpected weight change.   HENT: Negative for dental problem.    Respiratory: Negative for shortness of breath.    Cardiovascular: Negative for chest pain.   Gastrointestinal: Negative for blood in stool.   Genitourinary: Negative for dysuria.   Musculoskeletal: Positive for arthralgias and myalgias.   Skin: Negative for rash.   Allergic/Immunologic: Negative for environmental allergies.   Neurological:  Positive for weakness. Negative for syncope.   Psychiatric/Behavioral: The patient is not nervous/anxious.        Objective  Vitals:    11/07/18 1333   BP: 128/82   Pulse: 55   Temp: 98.4 °F (36.9 °C)   SpO2: 98%        Physical Exam   Constitutional: He is oriented to person, place, and time. He appears well-developed and well-nourished. No distress.   HENT:   Head: Normocephalic.   Nose: Nose normal.   Eyes: EOM are normal.   Cardiovascular: Normal rate, regular rhythm, normal heart sounds and intact distal pulses.    No murmur heard.  Pulmonary/Chest: Effort normal and breath sounds normal. No respiratory distress.   Musculoskeletal: Normal range of motion.   Mild tenderness along the cubital tunnel.  No limitations with range of motion, visually normal.   Neurological: He is alert and oriented to person, place, and time.   Skin: Skin is warm and dry. No rash noted.   Psychiatric: He has a normal mood and affect. His behavior is normal. Judgment and thought content normal.   Nursing note and vitals reviewed.        Current Outpatient Prescriptions:   •  Multiple Vitamins-Minerals (MULTI COMPLETE PO), Take 1 capsule by mouth Every Morning., Disp: , Rfl:   •  omeprazole (PriLOSEC) 40 MG capsule, Take 1 capsule (40 mg total) by mouth daily. (Patient taking differently: Take 40 mg by mouth As Needed.), Disp: 30 capsule, Rfl: 5  •  sildenafil (REVATIO) 20 MG tablet, TAKE UP TO 3 TABLETS BY MOUTH DAILY AS DIRECTED, Disp: 30 tablet, Rfl: 0  •  warfarin (COUMADIN) 5 MG tablet, Take 2 tablets by mouth Daily., Disp: 60 tablet, Rfl: 5  •  warfarin (COUMADIN) 7.5 MG tablet, Take 1 tablet by mouth Every Night. ON Sunday AND Thursday, (SAT MON, TUES AND WED AND Friday TAKES 5 MG), Disp: 30 tablet, Rfl: 1  •  Diclofenac Sodium (PENNSAID) 2 % solution, Place 1 ampule on the skin as directed by provider 2 (Two) Times a Day As Needed (pain)., Disp: 2 g, Rfl: 0    Procedures    Lab Results (most recent)     None              Herb  was seen today for establish care and arm pain.    Diagnoses and all orders for this visit:    Other hyperlipidemia  -     Lipid Panel    Screening for diabetes mellitus  -     Comprehensive Metabolic Panel    Hypovitaminosis D  -     Vitamin D 25 Hydroxy    Prostate cancer screening  -     PSA Screen    Chronic deep vein thrombosis (DVT) of popliteal vein of left lower extremity (CMS/HCC)  -     Protime-INR    Cubital tunnel syndrome on right  -     Diclofenac Sodium (PENNSAID) 2 % solution; Place 1 ampule on the skin as directed by provider 2 (Two) Times a Day As Needed (pain).      New patient to me, new diagnosis of cubital tunnel syndrome on the right elbow.  Topical diclofenac prescribed as he cannot take oral NSAIDs.  Recommended physical therapy, he has a friend who is a physical therapist and lost them first.    Chronic anticoagulation secondary to factor V Leiden disease with 2 DVTs.  Continue warfarin with INR goal 2-3.  He has been stable for many years now.  Warfarin is 5 mg daily except 7.5 Sun Thurs.      Return if symptoms worsen or fail to improve.      Hannah Cash MD

## 2018-11-12 ENCOUNTER — TELEPHONE (OUTPATIENT)
Dept: FAMILY MEDICINE CLINIC | Facility: CLINIC | Age: 63
End: 2018-11-12

## 2018-11-12 DIAGNOSIS — M25.529 ELBOW PAIN, UNSPECIFIED LATERALITY: Primary | ICD-10-CM

## 2018-11-12 LAB
25(OH)D3+25(OH)D2 SERPL-MCNC: 38.1 NG/ML (ref 30–100)
ALBUMIN SERPL-MCNC: 4.3 G/DL (ref 3.5–5.2)
ALBUMIN/GLOB SERPL: 1.5 G/DL
ALP SERPL-CCNC: 55 U/L (ref 39–117)
ALT SERPL-CCNC: 19 U/L (ref 1–41)
AST SERPL-CCNC: 19 U/L (ref 1–40)
BILIRUB SERPL-MCNC: 0.4 MG/DL (ref 0.1–1.2)
BUN SERPL-MCNC: 11 MG/DL (ref 8–23)
BUN/CREAT SERPL: 11.6 (ref 7–25)
CALCIUM SERPL-MCNC: 9.4 MG/DL (ref 8.6–10.5)
CHLORIDE SERPL-SCNC: 106 MMOL/L (ref 98–107)
CHOLEST SERPL-MCNC: 232 MG/DL (ref 0–200)
CO2 SERPL-SCNC: 27.3 MMOL/L (ref 22–29)
CREAT SERPL-MCNC: 0.95 MG/DL (ref 0.76–1.27)
GLOBULIN SER CALC-MCNC: 2.9 GM/DL
GLUCOSE SERPL-MCNC: 114 MG/DL (ref 65–99)
HDLC SERPL-MCNC: 52 MG/DL (ref 40–60)
INR PPP: 2.44 (ref 0.9–1.1)
LDLC SERPL CALC-MCNC: 158 MG/DL (ref 0–100)
POTASSIUM SERPL-SCNC: 4.7 MMOL/L (ref 3.5–5.2)
PROT SERPL-MCNC: 7.2 G/DL (ref 6–8.5)
PROTHROMBIN TIME: 26.1 SECONDS (ref 11.7–14.2)
PSA SERPL-MCNC: 2.52 NG/ML (ref 0–4)
SODIUM SERPL-SCNC: 144 MMOL/L (ref 136–145)
TRIGL SERPL-MCNC: 109 MG/DL (ref 0–150)
VLDLC SERPL CALC-MCNC: 21.8 MG/DL (ref 5–40)

## 2018-11-12 NOTE — TELEPHONE ENCOUNTER
Pt is requesting referral to [facility] PT for his elbow that was discussed last week at his appt.

## 2018-11-15 NOTE — PROGRESS NOTES
Please call patient with results. Kidney and liver functions look normal. Blood sugar is slightly high. Cholesterol is normal but ASCVD if 11.1%. INR is normal, FU 2 months, remaining labs are normal.  Please FU with me in the office to discuss hyperglycemia and cholesterol.     Thank You,    Hannah Cash M.D.

## 2018-11-27 ENCOUNTER — TREATMENT (OUTPATIENT)
Dept: PHYSICAL THERAPY | Facility: CLINIC | Age: 63
End: 2018-11-27

## 2018-11-27 DIAGNOSIS — M77.01 MEDIAL EPICONDYLITIS OF RIGHT ELBOW: ICD-10-CM

## 2018-11-27 DIAGNOSIS — M25.521 RIGHT ELBOW PAIN: Primary | ICD-10-CM

## 2018-11-27 PROCEDURE — 97110 THERAPEUTIC EXERCISES: CPT | Performed by: PHYSICAL THERAPIST

## 2018-11-27 PROCEDURE — 97161 PT EVAL LOW COMPLEX 20 MIN: CPT | Performed by: PHYSICAL THERAPIST

## 2018-11-27 PROCEDURE — 97140 MANUAL THERAPY 1/> REGIONS: CPT | Performed by: PHYSICAL THERAPIST

## 2018-11-27 NOTE — PROGRESS NOTES
Physical Therapy Initial Evaluation and Plan of Care      Patient: Herb Jack   : 1955  Diagnosis/ICD-10 Code:  Right elbow pain [M25.521]  Referring practitioner: Hannah Cash MD  Date of Initial Visit: 2018  Today's Date: 2018  Patient seen for 1 sessions           Subjective Questionnaire: QuickDASH: 9.09      Subjective Evaluation    History of Present Illness  Mechanism of injury: Pt reports he was lifting heavy item out of a car in 2018 and felt a pop in R medial elbow. Some improvement early on. Housework reaggrevated pain. Was prescribed topical ointment which has helped some, but elbow is still painful. 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 Describes pain as constant dull ache. Rates pain 2/10. Can't lift heavy briefcase. Has not tried heat or ice. No imaging. Denies Hx of elbow injury. Denies pain gripping. Denies tingling or burning.      Patient Occupation: Desk job Pain  Current pain ratin  Quality: dull ache  Relieving factors: medications  Aggravating factors: lifting  Progression: improved    Hand dominance: right    Diagnostic Tests  No diagnostic tests performed    Treatments  No previous or current treatments  Previous treatment: medication  Current treatment: physical therapy  Patient Goals  Patient goals for therapy: decreased pain, increased strength and return to sport/leisure activities             Objective       Palpation     Right Tenderness of the wrist flexors.     Tenderness     Right Elbow   Tenderness in the medial epicondyle.     Right Wrist/Hand   Tenderness in the medial epicondyle.     Additional Tenderness Details  Mild TTP R cubital tunnel    Active Range of Motion     Right Elbow   Normal active range of motion    Strength/Myotome Testing     Right Elbow   Flexion: 5  Extension: 5  Forearm supination: 4+  Forearm pronation: 4+ (P!)    Left Wrist/Hand      (2nd hand position)   lbs: 82    Right Wrist/Hand    Wrist extension: 5  Wrist flexion: 4 (P!)  Radial deviation: 4 (P!)  Ulnar deviation: 4 (P!)     (2nd hand position)   lbs: 64    Tests     Right Elbow   Positive passive medial epicondylitis.   Negative Tinel's sign (cubital tunnel).          Assessment & Plan     Assessment  Impairments: activity intolerance, impaired physical strength, lacks appropriate home exercise program and pain with function  Assessment details: Pt presents with tenderness of medial epicondyle, pain with resisted wrist flexion, and pain with passive stretch of wrist flexors. Pt will benefit from skilled PT services in order to address listed impairments and increase tolerance to normal daily activities including ADLs, work, and recreational activities.    Prognosis: good  Functional Limitations: carrying objects, lifting, pulling, pushing, uncomfortable because of pain and unable to perform repetitive tasks  Goals  Plan Goals: STG: To be met in 2 weeks:  1. Patient demonstrates independence/compliance with HEP to maintain gains made in PT.    LTG: To be met in 8 weeks:  1. Quick DASH score is less than 10% to demonstrate improved ability to perform ADLs.  2. R wrist sup/flex MMT is >/= to 5/5 and pain free  4. Pain is less than 2/10 with ADL performance.  5. Pt can lift briefcase w/o significant pain or limitation    Plan  Therapy options: will be seen for skilled physical therapy services  Planned modality interventions: cryotherapy, electrical stimulation/Russian stimulation, thermotherapy (hydrocollator packs), ultrasound and iontophoresis  Planned therapy interventions: abdominal trunk stabilization, ADL retraining, balance/weight-bearing training, body mechanics training, flexibility, functional ROM exercises, home exercise program, joint mobilization, manual therapy, neuromuscular re-education, postural training, soft tissue mobilization, spinal/joint mobilization, strengthening, stretching and therapeutic activities  Other  planned therapy interventions: Dry Needling  Frequency: 2x week  Duration in weeks: 12  Treatment plan discussed with: patient        Manual Therapy:    10     mins  81491;  Therapeutic Exercise:    10     mins  54082;     Neuromuscular Ryan:    0    mins  31005;    Therapeutic Activity:     0     mins  30779;     Gait Trainin     mins  76308;     Ultrasound:     8     mins  66476;    Electrical Stimulation:    0     mins  27333 ( );  Dry Needling     0     mins self-pay    Timed Treatment:   28   mins   Total Treatment:     60   mins    PT SIGNATURE: Lynette Trujillo, ZACKARY   DATE TREATMENT INITIATED: 2018    Initial Certification  Certification Period: 2019  I certify that the therapy services are furnished while this patient is under my care.  The services outlined above are required by this patient, and will be reviewed every 90 days.     PHYSICIAN: Hannah Cash MD      DATE:     Please sign and return via fax to [phone].. Thank you, [facility].

## 2018-12-04 ENCOUNTER — TREATMENT (OUTPATIENT)
Dept: PHYSICAL THERAPY | Facility: CLINIC | Age: 63
End: 2018-12-04

## 2018-12-04 DIAGNOSIS — M77.01 MEDIAL EPICONDYLITIS OF RIGHT ELBOW: ICD-10-CM

## 2018-12-04 DIAGNOSIS — M25.521 RIGHT ELBOW PAIN: Primary | ICD-10-CM

## 2018-12-04 PROCEDURE — 97035 APP MDLTY 1+ULTRASOUND EA 15: CPT | Performed by: PHYSICAL THERAPIST

## 2018-12-04 PROCEDURE — 97110 THERAPEUTIC EXERCISES: CPT | Performed by: PHYSICAL THERAPIST

## 2018-12-04 PROCEDURE — 97140 MANUAL THERAPY 1/> REGIONS: CPT | Performed by: PHYSICAL THERAPIST

## 2018-12-04 NOTE — PROGRESS NOTES
Physical Therapy Daily Progress Note      Visit # 2      Subjective   Pt reports no change in elbow pain. He has difficulty avoiding pain w/ ADLs.    Objective   See Exercise, Manual, and Modality Logs for complete treatment.       Assessment/Plan  Mild but tolerable pain (<2/10) with exercise. Had pinpoint tenderness in wrist flexors just distal to medial epicondyle. Good tolerance to active release. Increased # of sets of eccentrics in HEP. Progress per POC.                 Manual Therapy:    15     mins  46672;  Therapeutic Exercise:    15     mins  86037;     Neuromuscular Ryan:    0    mins  16195;    Therapeutic Activity:     0     mins  65982;     Gait Trainin     mins  08459;     Ultrasound:     8     mins  45968;    Work Hardening           0      mins 75042  Iontophoresis               0   mins 43577    Timed Treatment:   38   mins   Total Treatment:     60   mins    Lynette Trujillo, PT  Physical Therapist

## 2018-12-06 ENCOUNTER — TREATMENT (OUTPATIENT)
Dept: PHYSICAL THERAPY | Facility: CLINIC | Age: 63
End: 2018-12-06

## 2018-12-06 DIAGNOSIS — M77.01 MEDIAL EPICONDYLITIS OF RIGHT ELBOW: ICD-10-CM

## 2018-12-06 DIAGNOSIS — M25.521 RIGHT ELBOW PAIN: Primary | ICD-10-CM

## 2018-12-06 PROCEDURE — 97140 MANUAL THERAPY 1/> REGIONS: CPT | Performed by: PHYSICAL THERAPIST

## 2018-12-06 PROCEDURE — 97110 THERAPEUTIC EXERCISES: CPT | Performed by: PHYSICAL THERAPIST

## 2018-12-06 PROCEDURE — 97035 APP MDLTY 1+ULTRASOUND EA 15: CPT | Performed by: PHYSICAL THERAPIST

## 2018-12-06 NOTE — PROGRESS NOTES
" Physical Therapy Daily Progress Note    Visit #3    Subjective     Herb Jack reports: \"it felt the best it's felt\" after last visit.    Objective   See Exercise, Manual, and Modality Logs for complete treatment.       Assessment/Plan  Soft tissue restriction noted in wrist flexor musculature, at medial epicondyle. Pt requires cueing to ensure proper form and speed with exercises.  Progress per Plan of Care           Manual Therapy:    15     mins  38820;  Therapeutic Exercise:    15     mins  75342;     Neuromuscular Ryan:    0    mins  49268;    Therapeutic Activity:     0     mins  61476;     Gait Trainin     mins  67891;     Ultrasound:     8     mins  71023;    Electrical Stimulation:    0     mins  43514 ( );    Timed Treatment:   38   mins   Total Treatment:     48   mins    Madina Pro PT, DPT  Physical Therapist  [place] License #[id number]                    "

## 2018-12-11 ENCOUNTER — TREATMENT (OUTPATIENT)
Dept: PHYSICAL THERAPY | Facility: CLINIC | Age: 63
End: 2018-12-11

## 2018-12-11 DIAGNOSIS — M25.521 RIGHT ELBOW PAIN: Primary | ICD-10-CM

## 2018-12-11 DIAGNOSIS — M77.01 MEDIAL EPICONDYLITIS OF RIGHT ELBOW: ICD-10-CM

## 2018-12-11 PROCEDURE — 97140 MANUAL THERAPY 1/> REGIONS: CPT | Performed by: PHYSICAL THERAPIST

## 2018-12-11 PROCEDURE — 97110 THERAPEUTIC EXERCISES: CPT | Performed by: PHYSICAL THERAPIST

## 2018-12-11 PROCEDURE — 97035 APP MDLTY 1+ULTRASOUND EA 15: CPT | Performed by: PHYSICAL THERAPIST

## 2018-12-11 NOTE — PROGRESS NOTES
Physical Therapy Daily Progress Note    Visit #4    Subjective     Herb Jack reports: heat really helps his elbow. Asks about a brace to wear when he plays golf.      Objective   See Exercise, Manual, and Modality Logs for complete treatment.       Assessment/Plan  Advised pt to get a neoprene sleeve type brace, since heat helps relieve symptoms. Soft tissue restriction is improving.   Progress per Plan of Care           Manual Therapy:    15     mins  56830;  Therapeutic Exercise:    15     mins  61931;     Neuromuscular Ryan:    0    mins  78941;    Therapeutic Activity:     0     mins  27940;     Gait Trainin     mins  01423;     Ultrasound:     8     mins  84228;    Electrical Stimulation:    0     mins  46880 ( );    Timed Treatment:   38   mins   Total Treatment:     48   mins    Madina Pro PT, DPT  Physical Therapist  [place] License #[id number]

## 2018-12-13 ENCOUNTER — TREATMENT (OUTPATIENT)
Dept: PHYSICAL THERAPY | Facility: CLINIC | Age: 63
End: 2018-12-13

## 2018-12-13 DIAGNOSIS — M77.01 MEDIAL EPICONDYLITIS OF RIGHT ELBOW: ICD-10-CM

## 2018-12-13 DIAGNOSIS — M25.521 RIGHT ELBOW PAIN: Primary | ICD-10-CM

## 2018-12-13 PROCEDURE — 97140 MANUAL THERAPY 1/> REGIONS: CPT | Performed by: PHYSICAL THERAPIST

## 2018-12-13 PROCEDURE — 97110 THERAPEUTIC EXERCISES: CPT | Performed by: PHYSICAL THERAPIST

## 2018-12-13 PROCEDURE — 97035 APP MDLTY 1+ULTRASOUND EA 15: CPT | Performed by: PHYSICAL THERAPIST

## 2018-12-17 NOTE — PROGRESS NOTES
Physical Therapy Daily Progress Note    Visit #5    Subjective     Herb Jack reports: no new complaints.       Objective   See Exercise, Manual, and Modality Logs for complete treatment.       Assessment/Plan  Soft tissue restriction is decreasing, pain is improving but only minimally. Will continue to monitor and progress as able.  Progress per Plan of Care           Manual Therapy:    15     mins  61700;  Therapeutic Exercise:    20     mins  46452;     Neuromuscular Ryan:    0    mins  99602;    Therapeutic Activity:     0     mins  57903;     Gait Trainin     mins  52413;     Ultrasound:     8     mins  24263;    Electrical Stimulation:    0     mins  19327 ( );    Timed Treatment:   43   mins   Total Treatment:     53   mins    Madina Pro PT, DPT  Physical Therapist  [place] License #[id number]

## 2018-12-18 ENCOUNTER — TREATMENT (OUTPATIENT)
Dept: PHYSICAL THERAPY | Facility: CLINIC | Age: 63
End: 2018-12-18

## 2018-12-18 DIAGNOSIS — M25.521 RIGHT ELBOW PAIN: Primary | ICD-10-CM

## 2018-12-18 DIAGNOSIS — M77.01 MEDIAL EPICONDYLITIS OF RIGHT ELBOW: ICD-10-CM

## 2018-12-18 PROCEDURE — 97140 MANUAL THERAPY 1/> REGIONS: CPT | Performed by: PHYSICAL THERAPIST

## 2018-12-18 PROCEDURE — 97035 APP MDLTY 1+ULTRASOUND EA 15: CPT | Performed by: PHYSICAL THERAPIST

## 2018-12-18 PROCEDURE — 97110 THERAPEUTIC EXERCISES: CPT | Performed by: PHYSICAL THERAPIST

## 2018-12-18 NOTE — PROGRESS NOTES
Physical Therapy Daily Progress Note    Visit #6    Subjective     Herb Jack reports: he is hurting a little bit more over the past couple of days. Is interested in trial of dry needling.      Objective   See Exercise, Manual, and Modality Logs for complete treatment.       Assessment/Plan  Overall ~50% improvement in soft tissue restriction. Will trial dry needling next visit.  Progress per Plan of Care           Manual Therapy:    15     mins  17126;  Therapeutic Exercise:    15     mins  12338;     Neuromuscular Ryan:    0    mins  93317;    Therapeutic Activity:     0     mins  41963;     Gait Trainin     mins  19415;     Ultrasound:     8     mins  49740;    Electrical Stimulation:    0     mins  62652 ( );    Timed Treatment:   38   mins   Total Treatment:     48   mins    Madina Pro PT, DPT  Physical Therapist  [place] License #[id number]

## 2018-12-20 ENCOUNTER — TREATMENT (OUTPATIENT)
Dept: PHYSICAL THERAPY | Facility: CLINIC | Age: 63
End: 2018-12-20

## 2018-12-20 DIAGNOSIS — M77.01 MEDIAL EPICONDYLITIS OF RIGHT ELBOW: ICD-10-CM

## 2018-12-20 DIAGNOSIS — M25.521 RIGHT ELBOW PAIN: Primary | ICD-10-CM

## 2018-12-20 PROCEDURE — 97140 MANUAL THERAPY 1/> REGIONS: CPT | Performed by: PHYSICAL THERAPIST

## 2018-12-20 PROCEDURE — 97035 APP MDLTY 1+ULTRASOUND EA 15: CPT | Performed by: PHYSICAL THERAPIST

## 2018-12-20 PROCEDURE — DRYNDL PR CUSTOM DRY NEEDLING SELF PAY: Performed by: PHYSICAL THERAPIST

## 2018-12-20 PROCEDURE — 97110 THERAPEUTIC EXERCISES: CPT | Performed by: PHYSICAL THERAPIST

## 2018-12-20 NOTE — PROGRESS NOTES
Physical Therapy Daily Progress Note      Visit # 7      Subjective   Pt reports he would like to try dry needling.    Objective   Consulted w/ current therapist re: potential benefit of dry needling (R) elbow.     Soft tissue was assessed at R wrist flexors. PT noted point tenderness as well as palpable trigger points within the muscle tissue. On this date patient stated that they would like to undergo a dry needling procedure for the soft tissue dysfunction.   Patient was educated on the procedure for dry needling and consent waver was signed. Patient was informed of the risks, possible adverse effects, along with the benefits of TDN.      See Exercise, Manual, and Modality Logs for complete treatment.         Assessment/Plan  Fair tolerance to initiation of dry needling w/o c/o pain. Plan to assess efficacy at next follow-up. Will continue if pt reports significantly decreased pain.                 Manual Therapy:    0     mins  74913;  Therapeutic Exercise:    0     mins  43344;     Neuromuscular Ryan:    0    mins  62789;    Therapeutic Activity:     0     mins  19197;     Gait Trainin     mins  46778;     Ultrasound:     0     mins  80847;    Work Hardening           0      mins 34936  Iontophoresis               0   mins 31046    Timed Treatment:   0   mins   Total Treatment:     15   mins    Lynette Trujillo, PT  Physical Therapist

## 2018-12-22 NOTE — PROGRESS NOTES
Physical Therapy Daily Progress Note    Visit #7    Subjective     Herb Jack reports: he would like to try dry needling today. Leaves on vacation next week so will call when he returns if he needs further therapy.      Objective   See Exercise, Manual, and Modality Logs for complete treatment.       Assessment/Plan  Advised pt on long term HEP, with activity modifications if necessary. Will hold chart open 30 days in case of acute exacerbation, otherwise D/C.  Progress per Plan of Care           Manual Therapy:    15     mins  18092;  Therapeutic Exercise:    15     mins  35891;     Neuromuscular Ryan:    0    mins  45501;    Therapeutic Activity:     0     mins  19453;     Gait Trainin     mins  63733;     Ultrasound:     8     mins  25797;    Electrical Stimulation:    0     mins  91162 ( );    Timed Treatment:   38   mins   Total Treatment:     65   mins    Madina Pro PT, DPT  Physical Therapist  [place] License #[id number]

## 2019-01-16 ENCOUNTER — OFFICE VISIT (OUTPATIENT)
Dept: FAMILY MEDICINE CLINIC | Facility: CLINIC | Age: 64
End: 2019-01-16

## 2019-01-16 VITALS
HEART RATE: 63 BPM | HEIGHT: 72 IN | OXYGEN SATURATION: 98 % | TEMPERATURE: 97.6 F | DIASTOLIC BLOOD PRESSURE: 92 MMHG | BODY MASS INDEX: 33.59 KG/M2 | SYSTOLIC BLOOD PRESSURE: 142 MMHG | WEIGHT: 248 LBS

## 2019-01-16 DIAGNOSIS — I82.90 DEEP VEIN THROMBOSIS (DVT) OF NON-EXTREMITY VEIN, UNSPECIFIED CHRONICITY: ICD-10-CM

## 2019-01-16 DIAGNOSIS — E78.49 OTHER HYPERLIPIDEMIA: ICD-10-CM

## 2019-01-16 DIAGNOSIS — R73.9 HYPERGLYCEMIA: Primary | ICD-10-CM

## 2019-01-16 LAB
HBA1C MFR BLD: 5.4 %
INR PPP: 2.3 (ref 0.9–1.1)
PROTHROMBIN TIME: 14 SECONDS

## 2019-01-16 PROCEDURE — 36416 COLLJ CAPILLARY BLOOD SPEC: CPT | Performed by: FAMILY MEDICINE

## 2019-01-16 PROCEDURE — 83036 HEMOGLOBIN GLYCOSYLATED A1C: CPT | Performed by: FAMILY MEDICINE

## 2019-01-16 PROCEDURE — 99214 OFFICE O/P EST MOD 30 MIN: CPT | Performed by: FAMILY MEDICINE

## 2019-01-16 PROCEDURE — 85610 PROTHROMBIN TIME: CPT | Performed by: FAMILY MEDICINE

## 2019-01-16 NOTE — PROGRESS NOTES
Herb Jack is a 63 y.o. male.     Chief Complaint   Patient presents with   • Hyperglycemia     follow up after labs no complains   • Hyperlipidemia     follow up after labs no comp-ains   • Anticoagulation       HPI     Pt is a pleasant 63 y.o. YO male here for hyperglycemia, hyperlipidemia and anticoagulation.  PMH includes Factor V Leiden def with 2 DVTs on chronic anticoagulation with INR goal 2-3, ED, HLD not well controlled, GERD with EGD and improved with 40 lbs of weight loss.    HLD: Not well-controlled, his cholesterol is elevated and worsening from prior.  His ASCVD risk is 11%.  He has tried a statin in the past and had complications of myalgia.  He thinks it was likely Crestor.    Hyperglycemia, new problem and worsening with fasting blood work.  He has never been diagnosed with diabetes or insulin resistance.    Patient with recurrent DVTs now on anticoagulation, INR goal 2-3.  He has been on the same dose of warfarin for many years.  He eats a consistent amount of greens daily.  He has no issues with bleeding.  He has been encouraged to take a new oral anticoagulant and is wondering what the risks and benefits are of various treatments.  Additionally he would like to know if he should continue anticoagulation or if his risk of bleeding with the elevated.  He had a friend recently who recommended they stop oral anti-coagulation.  He is wondering how long he should be anticoagulated.    He has been doing physical therapy for right elbow pain and has had significant improvement.    The following portions of the patient's history were reviewed and updated as appropriate: allergies, current medications, past family history, past medical history, past social history, past surgical history and problem list.    Review of Systems   Constitutional: Negative.    Eyes: Negative.    Respiratory: Negative.    Cardiovascular: Negative.    Gastrointestinal: Negative.    Endocrine: Negative.     Genitourinary: Negative.    Musculoskeletal:        Right elbow pain   Neurological: Negative.    Hematological: Negative.    Psychiatric/Behavioral: Negative.        Objective  Vitals:    01/16/19 1531   BP: 142/92   Pulse: 63   Temp: 97.6 °F (36.4 °C)   SpO2: 98%        Physical Exam   Constitutional: He is oriented to person, place, and time. He appears well-developed and well-nourished. No distress.   HENT:   Head: Normocephalic.   Nose: Nose normal.   Eyes: EOM are normal.   Cardiovascular: Normal rate, regular rhythm, normal heart sounds and intact distal pulses.   No murmur heard.  Pulmonary/Chest: Effort normal and breath sounds normal. No respiratory distress.   Musculoskeletal: Normal range of motion.   Neurological: He is alert and oriented to person, place, and time.   Skin: Skin is warm and dry. No rash noted.   Psychiatric: He has a normal mood and affect. His behavior is normal. Judgment and thought content normal.   Nursing note and vitals reviewed.        Current Outpatient Medications:   •  Diclofenac Sodium (PENNSAID) 2 % solution, Place 1 ampule on the skin as directed by provider 2 (Two) Times a Day As Needed (pain)., Disp: 2 g, Rfl: 0  •  Multiple Vitamins-Minerals (MULTI COMPLETE PO), Take 1 capsule by mouth Every Morning., Disp: , Rfl:   •  omeprazole (PriLOSEC) 40 MG capsule, Take 1 capsule (40 mg total) by mouth daily. (Patient taking differently: Take 40 mg by mouth As Needed.), Disp: 30 capsule, Rfl: 5  •  sildenafil (REVATIO) 20 MG tablet, TAKE UP TO 3 TABLETS BY MOUTH DAILY AS DIRECTED, Disp: 30 tablet, Rfl: 0  •  warfarin (COUMADIN) 5 MG tablet, Take 2 tablets by mouth Daily., Disp: 60 tablet, Rfl: 5  •  warfarin (COUMADIN) 7.5 MG tablet, Take 1 tablet by mouth Every Night. ON Sunday AND Thursday, (SAT MON, TUES AND WED AND Friday TAKES 5 MG), Disp: 30 tablet, Rfl: 1    Procedures    Lab Results (most recent)     None        Office Visit on 01/16/2019   Component Date Value Ref Range  Status   • Hemoglobin A1C 01/16/2019 5.4  % Final   • Protime 01/16/2019 14.0  seconds Final   • INR 01/16/2019 2.3* 0.9 - 1.1 Final           Herb was seen today for hyperglycemia, hyperlipidemia and anticoagulation.    Diagnoses and all orders for this visit:    Hyperglycemia  -     POC Glycosylated Hemoglobin (Hb A1C)    Other hyperlipidemia    Deep vein thrombosis (DVT) of non-extremity vein, unspecified chronicity  -     POC Protime / INR      Hyperglycemia well-controlled, hemoglobin A1c normal.  No insulin resistance.    Hyperlipidemia worsening, ASCVD risk of 11%.  Patient opted to not try statin as he has had myalgia in the past.  He is very motivated to do lifestyle change.  We'll follow-up in 6 months.    Anticoagulation secondary to factor V Leiden deficiency with recurrent DVT.  He has been extremely stable with the same dose of warfarin.  Okay to recheck INR in 3 months.  We did discuss changing to a new oral anticoagulant, either Xarelto or Elloquis.  He will discuss this further with his cardiologist.  No change for now.    Return in about 6 months (around 7/16/2019), or if symptoms worsen or fail to improve, for Recheck for HLD - 6 month, 3 months for INR.      Hannah Cash MD

## 2019-04-18 ENCOUNTER — ANTICOAGULATION VISIT (OUTPATIENT)
Dept: FAMILY MEDICINE CLINIC | Facility: CLINIC | Age: 64
End: 2019-04-18

## 2019-04-18 LAB — INR PPP: 2.4 (ref 0.9–1.1)

## 2019-04-18 PROCEDURE — 36416 COLLJ CAPILLARY BLOOD SPEC: CPT | Performed by: NURSE PRACTITIONER

## 2019-04-18 PROCEDURE — 85610 PROTHROMBIN TIME: CPT | Performed by: NURSE PRACTITIONER

## 2019-04-18 NOTE — PATIENT INSTRUCTIONS
4/18/2019, INR 2.4 Protime 22.2. Per j luis pt takes 5 mg 5 days a week, and takes 7.5mg 2 days a week . Cont the same no changes recheck in 3 months as aptient has been done

## 2019-06-28 RX ORDER — WARFARIN SODIUM 5 MG/1
TABLET ORAL
Qty: 60 TABLET | Refills: 4 | Status: SHIPPED | OUTPATIENT
Start: 2019-06-28 | End: 2019-07-16

## 2019-06-28 RX ORDER — WARFARIN SODIUM 5 MG/1
TABLET ORAL
Qty: 60 TABLET | Refills: 4 | OUTPATIENT
Start: 2019-06-28

## 2019-07-03 RX ORDER — OMEPRAZOLE 40 MG/1
40 CAPSULE, DELAYED RELEASE ORAL DAILY
Qty: 30 CAPSULE | Refills: 5 | Status: SHIPPED | OUTPATIENT
Start: 2019-07-03

## 2019-07-05 RX ORDER — SILDENAFIL CITRATE 20 MG/1
TABLET ORAL
Qty: 30 TABLET | Refills: 0 | OUTPATIENT
Start: 2019-07-05

## 2019-07-16 ENCOUNTER — OFFICE VISIT (OUTPATIENT)
Dept: FAMILY MEDICINE CLINIC | Facility: CLINIC | Age: 64
End: 2019-07-16

## 2019-07-16 VITALS
WEIGHT: 245 LBS | OXYGEN SATURATION: 99 % | HEART RATE: 61 BPM | BODY MASS INDEX: 33.18 KG/M2 | SYSTOLIC BLOOD PRESSURE: 128 MMHG | DIASTOLIC BLOOD PRESSURE: 76 MMHG | TEMPERATURE: 98 F | HEIGHT: 72 IN

## 2019-07-16 DIAGNOSIS — I82.532 CHRONIC DEEP VEIN THROMBOSIS (DVT) OF POPLITEAL VEIN OF LEFT LOWER EXTREMITY (HCC): ICD-10-CM

## 2019-07-16 DIAGNOSIS — I82.90 DEEP VEIN THROMBOSIS (DVT) OF NON-EXTREMITY VEIN, UNSPECIFIED CHRONICITY: Primary | ICD-10-CM

## 2019-07-16 DIAGNOSIS — E78.49 OTHER HYPERLIPIDEMIA: ICD-10-CM

## 2019-07-16 DIAGNOSIS — D68.51 FACTOR V LEIDEN MUTATION (HCC): ICD-10-CM

## 2019-07-16 LAB
INR PPP: 3.5 (ref 0.9–1.1)
PROTHROMBIN TIME: 20 SECONDS

## 2019-07-16 PROCEDURE — 85610 PROTHROMBIN TIME: CPT | Performed by: FAMILY MEDICINE

## 2019-07-16 PROCEDURE — 99214 OFFICE O/P EST MOD 30 MIN: CPT | Performed by: FAMILY MEDICINE

## 2019-07-16 PROCEDURE — 36416 COLLJ CAPILLARY BLOOD SPEC: CPT | Performed by: FAMILY MEDICINE

## 2019-07-16 NOTE — PROGRESS NOTES
Herb Jack is a 64 y.o. male.     Chief Complaint   Patient presents with   • Hyperlipidemia     follow up no complains    • Deep Vein Thrombosis     follow up no complains today        HPI     Pt is a pleasant 64 y.o. YO male here for hyperlipidemia and deep vein thrombosis.    Hyperlipidemia not well controlled with ASCVD of 11%.  Patient does not want to start a statin as he has had myalgia in the past.  He has been working to eat a higher plant diet with fish.  Plan to have re-check today.    Patient with history of factor V Leiden mutation and recurrent DVT on chronic anticoagulation with warfarin.  He has been on 5 and 7.5 mg doses alternating for years.  Usually his INR is stable around 2.4.  He does work to eat a consistent amount of greens but has recently had difficulty as he is trying to eat a higher plant diet.  He has no issues of bleeding but has had some issue with getting control of the INR.      The following portions of the patient's history were reviewed and updated as appropriate: allergies, current medications, past family history, past medical history, past social history, past surgical history and problem list.    Review of Systems   Constitutional: Negative.    HENT: Negative.    Eyes: Negative.    Respiratory: Negative.    Cardiovascular: Negative.    Gastrointestinal: Negative.    Endocrine: Negative.    Genitourinary: Negative.    Musculoskeletal: Negative.    Allergic/Immunologic: Negative.    Neurological: Negative.    Hematological: Negative.    Psychiatric/Behavioral: Negative.        Objective  Vitals:    07/16/19 1531   BP: 128/76   Pulse: 61   Temp: 98 °F (36.7 °C)   SpO2: 99%        Physical Exam   Constitutional: He is oriented to person, place, and time. He appears well-developed and well-nourished. No distress.   HENT:   Head: Normocephalic.   Nose: Nose normal.   Eyes: EOM are normal.   Cardiovascular:   No murmur heard.  Pulmonary/Chest: Effort normal. No respiratory  distress.   Musculoskeletal: Normal range of motion.   Neurological: He is alert and oriented to person, place, and time.   Skin: Skin is warm and dry. No rash noted.   Psychiatric: He has a normal mood and affect. His behavior is normal. Judgment and thought content normal.   Nursing note and vitals reviewed.        Current Outpatient Medications:   •  Diclofenac Sodium (PENNSAID) 2 % solution, Place 1 ampule on the skin as directed by provider 2 (Two) Times a Day As Needed (pain)., Disp: 2 g, Rfl: 0  •  Multiple Vitamins-Minerals (MULTI COMPLETE PO), Take 1 capsule by mouth Every Morning., Disp: , Rfl:   •  omeprazole (priLOSEC) 40 MG capsule, Take 1 capsule by mouth Daily., Disp: 30 capsule, Rfl: 5  •  sildenafil (REVATIO) 20 MG tablet, TAKE UP TO 3 TABLETS BY MOUTH DAILY AS DIRECTED, Disp: 30 tablet, Rfl: 0  •  Apixaban (ELIQUIS STARTER PACK) tablet, Take two 5 mg tablets by mouth every 12 hours for 7 days. Followed by one 5 mg tablet every 12 hours. (Dispense starter pack if available), Disp: 74 tablet, Rfl: 2    Procedures    Lab Results (most recent)     None              Herb was seen today for hyperlipidemia and deep vein thrombosis.    Diagnoses and all orders for this visit:    Deep vein thrombosis (DVT) of non-extremity vein, unspecified chronicity  -     POC Protime / INR    Other hyperlipidemia  -     Lipid Panel    Factor V Leiden mutation (CMS/[facility])  -     Apixaban (ELIQUIS STARTER PACK) tablet; Take two 5 mg tablets by mouth every 12 hours for 7 days. Followed by one 5 mg tablet every 12 hours. (Dispense starter pack if available)    Chronic deep vein thrombosis (DVT) of popliteal vein of left lower extremity (CMS/[facility])  -     Apixaban (ELIQUIS STARTER PACK) tablet; Take two 5 mg tablets by mouth every 12 hours for 7 days. Followed by one 5 mg tablet every 12 hours. (Dispense starter pack if available)      Hyperlipidemia not well controlled, patient does not want to start statin secondary to  myopathy.  Will recheck lipids today.  ASCVD previous at 11%.  We will see with lifestyle modifications if this is improved.    Patient with history of factor V Leiden mutation with chronic DVT.  Plan to discontinue warfarin as we are having difficulty controlling his INR.  Especially as he is trying to eat a healthier diet to control his cholesterol.  Will initiate Eliquis after his INR is lower than 2.  Will recheck INR with nurse on Monday.  If INR is 2 or less will start Eliquis starter pack.  We discussed benefit risks and alternatives to Eliquis, patient in agreement to start.    Return if symptoms worsen or fail to improve, for Nurse on Monday for INR.      Hannah Cash MD

## 2019-07-22 ENCOUNTER — TELEPHONE (OUTPATIENT)
Dept: FAMILY MEDICINE CLINIC | Facility: CLINIC | Age: 64
End: 2019-07-22

## 2019-07-22 ENCOUNTER — ANTICOAGULATION VISIT (OUTPATIENT)
Dept: FAMILY MEDICINE CLINIC | Facility: CLINIC | Age: 64
End: 2019-07-22

## 2019-07-22 DIAGNOSIS — I82.499 DEEP VEIN THROMBOSIS (DVT) OF OTHER VEIN OF LOWER EXTREMITY, UNSPECIFIED CHRONICITY, UNSPECIFIED LATERALITY (HCC): Primary | ICD-10-CM

## 2019-07-22 LAB — INR PPP: 1 (ref 0.9–1.1)

## 2019-07-22 PROCEDURE — 36416 COLLJ CAPILLARY BLOOD SPEC: CPT | Performed by: FAMILY MEDICINE

## 2019-07-22 PROCEDURE — 85610 PROTHROMBIN TIME: CPT | Performed by: FAMILY MEDICINE

## 2019-07-22 NOTE — TELEPHONE ENCOUNTER
She came for INR check.  INR of 1.  Plan to start Eliquis at 10 mg twice daily and decrease to 5 mg twice daily after 1 week.  He is aware that of benefits, risks and alternatives.  He discussed this further with 1 of his friends who is a cardiologist as well.  He is aware that there is not a reversible agent removal but otherwise studies show the Eliquis has a much lower bleeding rate than other types of anticoagulants.  He will avoid aspirin, NSAIDs and use Tylenol for pain.

## 2019-07-23 LAB
CHOLEST SERPL-MCNC: 211 MG/DL (ref 0–200)
HDLC SERPL-MCNC: 43 MG/DL (ref 40–60)
LDLC SERPL CALC-MCNC: 146 MG/DL (ref 0–100)
TRIGL SERPL-MCNC: 109 MG/DL (ref 0–150)
VLDLC SERPL CALC-MCNC: 21.8 MG/DL

## 2019-07-25 NOTE — PROGRESS NOTES
Please call patient back with results.  The labs has resulted as improved cholesterol.  Your ASCVD has increased from 11 to 12%, I think this is likely because of her your recent birthday.  I am aware that you would prefer not to start a statin with the prior history of myalgia.  With these results that would be the recommendation we would have.  There are other medications we can use to treat cholesterol which may have some benefit but we do not have sufficient studies to say.  Continue with aggressive lifestyle changes and if you would like to discuss further medication treatment for cholesterol I am glad to see you in the office.    Thank you

## 2019-10-14 DIAGNOSIS — I82.532 CHRONIC DEEP VEIN THROMBOSIS (DVT) OF POPLITEAL VEIN OF LEFT LOWER EXTREMITY (HCC): ICD-10-CM

## 2019-10-14 DIAGNOSIS — D68.51 FACTOR V LEIDEN MUTATION (HCC): ICD-10-CM

## 2019-10-14 RX ORDER — APIXABAN 5 MG/1
TABLET, FILM COATED ORAL
Qty: 60 TABLET | Refills: 0 | Status: SHIPPED | OUTPATIENT
Start: 2019-10-14 | End: 2019-10-15 | Stop reason: SDUPTHER

## 2019-10-15 DIAGNOSIS — I82.532 CHRONIC DEEP VEIN THROMBOSIS (DVT) OF POPLITEAL VEIN OF LEFT LOWER EXTREMITY (HCC): ICD-10-CM

## 2019-10-15 DIAGNOSIS — D68.51 FACTOR V LEIDEN MUTATION (HCC): ICD-10-CM

## 2019-10-15 RX ORDER — APIXABAN 5 MG/1
TABLET, FILM COATED ORAL
Qty: 60 TABLET | Refills: 0 | Status: SHIPPED | OUTPATIENT
Start: 2019-10-15 | End: 2019-12-11 | Stop reason: SDUPTHER

## 2019-12-11 DIAGNOSIS — D68.51 FACTOR V LEIDEN MUTATION (HCC): ICD-10-CM

## 2019-12-11 DIAGNOSIS — I82.532 CHRONIC DEEP VEIN THROMBOSIS (DVT) OF POPLITEAL VEIN OF LEFT LOWER EXTREMITY (HCC): ICD-10-CM

## 2019-12-11 RX ORDER — APIXABAN 5 MG/1
TABLET, FILM COATED ORAL
Qty: 60 TABLET | Refills: 0 | Status: SHIPPED | OUTPATIENT
Start: 2019-12-11 | End: 2020-01-09

## 2019-12-12 NOTE — PROGRESS NOTES
Physical Therapy Daily Progress Note    Subjective     Herb Jack reports: he saw his MD, who is pleased with progress.  Released him to full activity but recommended he avoid jumping, running.      Objective   See Exercise, Manual, and Modality Logs for complete treatment.   Left knee ROM 0-139 degrees  Quad/hamstring strength       Assessment/Plan  All goals met except LEFS, anticipate pt will meet this goal on his own.  Appropriate for D/C to HEP at this time           Manual Therapy:    0     mins  85592;  Therapeutic Exercise:    50     mins  83434;     Neuromuscular Ryan:    0    mins  62391;    Therapeutic Activity:     0     mins  24219;     Gait Trainin     mins  19442;     Ultrasound:     0     mins  04755;    Electrical Stimulation:    0     mins  41512 ( );    Timed Treatment:   50   mins   Total Treatment:     60   mins    Madina Pro PT, DPT  Physical Therapist  [place] License #[id number]                    
Discharge Report    Patient Name: Herb Jack       Patient MRN: [id number]  : 1955  Physician:Elijah Monet MD  Date: 2017    Encounter Diagnoses   Name Primary?   • Tear of medial meniscus of left knee, current, unspecified tear type, subsequent encounter Yes   • Tear of lateral meniscus of left knee, current, unspecified tear type, subsequent encounter    • S/P left knee arthroscopy        Treatment has included therapeutic exercise, neuromuscular re-education and cryotherapy     Physical Therapy Daily Progress Note    Subjective     Herb Jack reports: he saw his MD, who is pleased with progress.  Released him to full activity but recommended he avoid jumping, running.      Objective   See Exercise, Manual, and Modality Logs for complete treatment.   Left knee ROM 0-139 degrees  Quad/hamstring strength       Assessment/Plan  All goals met except LEFS, anticipate pt will meet this goal on his own.  Appropriate for D/C to HEP at this time           Manual Therapy:    0     mins  85539;  Therapeutic Exercise:    50     mins  02873;     Neuromuscular Ryan:    0    mins  76952;    Therapeutic Activity:     0     mins  85427;     Gait Trainin     mins  92379;     Ultrasound:     0     mins  58881;    Electrical Stimulation:    0     mins  89053 ( );    Timed Treatment:   50   mins   Total Treatment:     60   mins    Madina Pro PT, DPT  Physical Therapist  [place] License #[id number]                    Assessment:   Progress towards goals: Partially Met    Discharge Reason: Patient achieved goals      Plan of Care: Continue with current home exercise program as instructed    Prognosis: excellent    Understanding at Discharge: excellent    
no

## 2020-01-09 DIAGNOSIS — I82.532 CHRONIC DEEP VEIN THROMBOSIS (DVT) OF POPLITEAL VEIN OF LEFT LOWER EXTREMITY (HCC): ICD-10-CM

## 2020-01-09 DIAGNOSIS — D68.51 FACTOR V LEIDEN MUTATION (HCC): ICD-10-CM

## 2020-01-09 RX ORDER — APIXABAN 5 MG/1
TABLET, FILM COATED ORAL
Qty: 60 TABLET | Refills: 0 | Status: SHIPPED | OUTPATIENT
Start: 2020-01-09 | End: 2020-02-10

## 2020-02-08 DIAGNOSIS — D68.51 FACTOR V LEIDEN MUTATION (HCC): ICD-10-CM

## 2020-02-08 DIAGNOSIS — I82.532 CHRONIC DEEP VEIN THROMBOSIS (DVT) OF POPLITEAL VEIN OF LEFT LOWER EXTREMITY (HCC): ICD-10-CM

## 2020-02-10 RX ORDER — APIXABAN 5 MG/1
TABLET, FILM COATED ORAL
Qty: 60 TABLET | Refills: 0 | Status: SHIPPED | OUTPATIENT
Start: 2020-02-10 | End: 2020-03-09

## 2020-03-09 DIAGNOSIS — I82.532 CHRONIC DEEP VEIN THROMBOSIS (DVT) OF POPLITEAL VEIN OF LEFT LOWER EXTREMITY (HCC): ICD-10-CM

## 2020-03-09 DIAGNOSIS — D68.51 FACTOR V LEIDEN MUTATION (HCC): ICD-10-CM

## 2020-03-09 RX ORDER — APIXABAN 5 MG/1
TABLET, FILM COATED ORAL
Qty: 60 TABLET | Refills: 0 | Status: SHIPPED | OUTPATIENT
Start: 2020-03-09 | End: 2020-04-07

## 2020-04-07 DIAGNOSIS — D68.51 FACTOR V LEIDEN MUTATION (HCC): ICD-10-CM

## 2020-04-07 DIAGNOSIS — I82.532 CHRONIC DEEP VEIN THROMBOSIS (DVT) OF POPLITEAL VEIN OF LEFT LOWER EXTREMITY (HCC): ICD-10-CM

## 2020-04-07 RX ORDER — APIXABAN 5 MG/1
TABLET, FILM COATED ORAL
Qty: 60 TABLET | Refills: 0 | Status: SHIPPED | OUTPATIENT
Start: 2020-04-07 | End: 2020-05-08

## 2020-05-08 DIAGNOSIS — I82.532 CHRONIC DEEP VEIN THROMBOSIS (DVT) OF POPLITEAL VEIN OF LEFT LOWER EXTREMITY (HCC): ICD-10-CM

## 2020-05-08 DIAGNOSIS — D68.51 FACTOR V LEIDEN MUTATION (HCC): ICD-10-CM

## 2020-05-08 RX ORDER — APIXABAN 5 MG/1
TABLET, FILM COATED ORAL
Qty: 60 TABLET | Refills: 0 | Status: SHIPPED | OUTPATIENT
Start: 2020-05-08 | End: 2020-06-05

## 2020-05-26 RX ORDER — SILDENAFIL CITRATE 20 MG/1
TABLET ORAL
Qty: 90 TABLET | Refills: 0 | Status: SHIPPED | OUTPATIENT
Start: 2020-05-26 | End: 2020-12-21

## 2020-06-05 DIAGNOSIS — D68.51 FACTOR V LEIDEN MUTATION (HCC): ICD-10-CM

## 2020-06-05 DIAGNOSIS — I82.532 CHRONIC DEEP VEIN THROMBOSIS (DVT) OF POPLITEAL VEIN OF LEFT LOWER EXTREMITY (HCC): ICD-10-CM

## 2020-06-05 RX ORDER — APIXABAN 5 MG/1
TABLET, FILM COATED ORAL
Qty: 60 TABLET | Refills: 0 | Status: SHIPPED | OUTPATIENT
Start: 2020-06-05 | End: 2020-07-08

## 2020-07-05 DIAGNOSIS — D68.51 FACTOR V LEIDEN MUTATION (HCC): ICD-10-CM

## 2020-07-05 DIAGNOSIS — I82.532 CHRONIC DEEP VEIN THROMBOSIS (DVT) OF POPLITEAL VEIN OF LEFT LOWER EXTREMITY (HCC): ICD-10-CM

## 2020-07-06 RX ORDER — SILDENAFIL CITRATE 20 MG/1
TABLET ORAL
Qty: 30 TABLET | Refills: 0 | OUTPATIENT
Start: 2020-07-06

## 2020-07-08 RX ORDER — APIXABAN 5 MG/1
TABLET, FILM COATED ORAL
Qty: 60 TABLET | Refills: 0 | Status: SHIPPED | OUTPATIENT
Start: 2020-07-08 | End: 2020-08-10 | Stop reason: SDUPTHER

## 2020-08-06 DIAGNOSIS — I82.532 CHRONIC DEEP VEIN THROMBOSIS (DVT) OF POPLITEAL VEIN OF LEFT LOWER EXTREMITY (HCC): ICD-10-CM

## 2020-08-06 DIAGNOSIS — D68.51 FACTOR V LEIDEN MUTATION (HCC): ICD-10-CM

## 2020-08-06 RX ORDER — APIXABAN 5 MG/1
TABLET, FILM COATED ORAL
Qty: 60 TABLET | Refills: 0 | OUTPATIENT
Start: 2020-08-06

## 2020-08-10 DIAGNOSIS — I82.532 CHRONIC DEEP VEIN THROMBOSIS (DVT) OF POPLITEAL VEIN OF LEFT LOWER EXTREMITY (HCC): ICD-10-CM

## 2020-08-10 DIAGNOSIS — D68.51 FACTOR V LEIDEN MUTATION (HCC): ICD-10-CM

## 2020-08-10 NOTE — TELEPHONE ENCOUNTER
Patient scheduled an appointment for 's first opening but he is out of this medication and is requesting it be refilled before his appointment.

## 2020-08-19 ENCOUNTER — OFFICE VISIT (OUTPATIENT)
Dept: FAMILY MEDICINE CLINIC | Facility: CLINIC | Age: 65
End: 2020-08-19

## 2020-08-19 VITALS
OXYGEN SATURATION: 98 % | DIASTOLIC BLOOD PRESSURE: 80 MMHG | BODY MASS INDEX: 33.08 KG/M2 | SYSTOLIC BLOOD PRESSURE: 142 MMHG | HEART RATE: 72 BPM | HEIGHT: 72 IN | TEMPERATURE: 98.4 F | WEIGHT: 244.2 LBS

## 2020-08-19 DIAGNOSIS — E78.49 OTHER HYPERLIPIDEMIA: ICD-10-CM

## 2020-08-19 DIAGNOSIS — R39.11 URINARY HESITANCY: ICD-10-CM

## 2020-08-19 DIAGNOSIS — E55.9 HYPOVITAMINOSIS D: ICD-10-CM

## 2020-08-19 DIAGNOSIS — R39.11 BENIGN PROSTATIC HYPERPLASIA WITH URINARY HESITANCY: ICD-10-CM

## 2020-08-19 DIAGNOSIS — N52.8 OTHER MALE ERECTILE DYSFUNCTION: ICD-10-CM

## 2020-08-19 DIAGNOSIS — D68.51 FACTOR V LEIDEN MUTATION (HCC): ICD-10-CM

## 2020-08-19 DIAGNOSIS — Z12.5 SCREENING PSA (PROSTATE SPECIFIC ANTIGEN): ICD-10-CM

## 2020-08-19 DIAGNOSIS — I82.532 CHRONIC DEEP VEIN THROMBOSIS (DVT) OF POPLITEAL VEIN OF LEFT LOWER EXTREMITY (HCC): Primary | ICD-10-CM

## 2020-08-19 DIAGNOSIS — N40.1 BENIGN PROSTATIC HYPERPLASIA WITH URINARY HESITANCY: ICD-10-CM

## 2020-08-19 PROCEDURE — 99214 OFFICE O/P EST MOD 30 MIN: CPT | Performed by: FAMILY MEDICINE

## 2020-08-19 RX ORDER — TAMSULOSIN HYDROCHLORIDE 0.4 MG/1
1 CAPSULE ORAL DAILY
Qty: 30 CAPSULE | Refills: 11 | Status: SHIPPED | OUTPATIENT
Start: 2020-08-19 | End: 2020-09-15

## 2020-08-19 NOTE — PROGRESS NOTES
Herb Jack is a 65 y.o. male.     Chief Complaint   Patient presents with   • Deep Vein Thrombosis     Patient has not been seen in over a year needed to f/u to continue to get his medications refilled    • Urinary Urgency     Patient is wanting to discuss that he has noticed over the past 9 months to a year  that he when he has to go to the bathroom is more urgent. He is wanting to know if has anything to do with age or the eliquis he is taking        HPI     Pt is a pleasant 65 y.o. YO male here for PT with chronic DVT and Factor V leiden and now with urinary urgency.  He is having new problems with urnary hesitancy, urgency .  He has no pain, no blood, no history of STDs, monogamous with his wife, his symptoms are usually at night.  Chronic DVT well-controlled on Eliquis.  He has been taking the medication for 1 year without issue.  We discussed frequency of labs with this medication.    The following portions of the patient's history were reviewed and updated as appropriate: allergies, current medications, past family history, past medical history, past social history, past surgical history and problem list.    Review of Systems   Constitutional: Negative for activity change and appetite change.   HENT: Negative for congestion and dental problem.    Eyes: Negative for discharge and itching.   Respiratory: Negative for apnea and chest tightness.    Cardiovascular: Negative for chest pain, palpitations and leg swelling.   Gastrointestinal: Negative for abdominal distention and abdominal pain.   Endocrine: Negative for cold intolerance and heat intolerance.   Genitourinary: Positive for urgency.   Musculoskeletal: Negative for arthralgias and back pain.   Skin: Negative for color change and pallor.   Allergic/Immunologic: Negative for environmental allergies and food allergies.   Neurological: Negative for dizziness and facial asymmetry.   Hematological: Negative for adenopathy. Does not bruise/bleed  easily.   Psychiatric/Behavioral: Negative for agitation and behavioral problems.       Objective  Vitals:    08/19/20 1442   BP: 142/80   Pulse: 72   Temp: 98.4 °F (36.9 °C)   SpO2: 98%        Physical Exam   Constitutional: He is oriented to person, place, and time. He appears well-developed and well-nourished. No distress.   HENT:   Head: Normocephalic.   Nose: Nose normal.   Eyes: EOM are normal.   Cardiovascular: Normal rate, regular rhythm, normal heart sounds and intact distal pulses.   No murmur heard.  Pulmonary/Chest: Effort normal and breath sounds normal. No respiratory distress.   Musculoskeletal: Normal range of motion.   Neurological: He is alert and oriented to person, place, and time.   Skin: Skin is warm and dry. No rash noted.   Psychiatric: He has a normal mood and affect. His behavior is normal. Judgment and thought content normal.   Nursing note and vitals reviewed.        Current Outpatient Medications:   •  apixaban (Eliquis) 5 MG tablet tablet, Take 1 tablet by mouth Every 12 (Twelve) Hours., Disp: 180 tablet, Rfl: 3  •  Multiple Vitamins-Minerals (MULTI COMPLETE PO), Take 1 capsule by mouth Every Morning., Disp: , Rfl:   •  omeprazole (priLOSEC) 40 MG capsule, Take 1 capsule by mouth Daily., Disp: 30 capsule, Rfl: 5  •  sildenafil (REVATIO) 20 MG tablet, TAKE UP TO 3 TABLETS BY MOUTH DAILY AS DIRECTED, Disp: 90 tablet, Rfl: 0  •  tamsulosin (FLOMAX) 0.4 MG capsule 24 hr capsule, Take 1 capsule by mouth Daily., Disp: 30 capsule, Rfl: 11    Procedures    Lab Results (most recent)     None              Herb was seen today for deep vein thrombosis and urinary urgency.    Diagnoses and all orders for this visit:    Chronic deep vein thrombosis (DVT) of popliteal vein of left lower extremity (CMS/HCC)  -     Comprehensive Metabolic Panel  -     CBC & Differential  -     apixaban (Eliquis) 5 MG tablet tablet; Take 1 tablet by mouth Every 12 (Twelve) Hours.    Other hyperlipidemia  -     Lipid  Panel  -     CBC & Differential    Other male erectile dysfunction    Screening PSA (prostate specific antigen)  -     PSA Screen    Benign prostatic hyperplasia with urinary hesitancy  -     Comprehensive Metabolic Panel  -     Urinalysis With Microscopic - Urine, Clean Catch  -     tamsulosin (FLOMAX) 0.4 MG capsule 24 hr capsule; Take 1 capsule by mouth Daily.    Urinary hesitancy  -     PSA Screen  -     Urinalysis With Microscopic - Urine, Clean Catch    Hypovitaminosis D  -     Vitamin D 25 Hydroxy    Factor V Leiden mutation (CMS/HCC)  -     apixaban (Eliquis) 5 MG tablet tablet; Take 1 tablet by mouth Every 12 (Twelve) Hours.      Pleasant 65-year-old male here to follow-up multiple issues.  Factor V Leiden on chronic anticoagulation due to chronic DVTs well controlled on apixaban 5 mg twice daily.  Refill as above, labs as above.    New problem of urinary hesitancy, urgency and increased urinary frequency at night.  Likely BPH, will check PSA as above, trial of tamsulosin with follow-up in 1 month.  Blood pressure is borderline elevated, I think his blood pressure will sustain tamsulosin.  Warnings for hypotension given.    Fasting labs as above, follow-up for annual physical    Return in about 4 weeks (around 9/16/2020), or if symptoms worsen or fail to improve, for Annual physical with fasting labs prior.      Hannah Cash MD

## 2020-08-27 LAB
25(OH)D3+25(OH)D2 SERPL-MCNC: 37.3 NG/ML (ref 30–100)
ALBUMIN SERPL-MCNC: 4.5 G/DL (ref 3.5–5.2)
ALBUMIN/GLOB SERPL: 2 G/DL
ALP SERPL-CCNC: 49 U/L (ref 39–117)
ALT SERPL-CCNC: 17 U/L (ref 1–41)
APPEARANCE UR: CLEAR
AST SERPL-CCNC: 20 U/L (ref 1–40)
BACTERIA #/AREA URNS HPF: NORMAL /HPF
BASOPHILS # BLD AUTO: 0.05 10*3/MM3 (ref 0–0.2)
BASOPHILS NFR BLD AUTO: 1 % (ref 0–1.5)
BILIRUB SERPL-MCNC: 0.7 MG/DL (ref 0–1.2)
BILIRUB UR QL STRIP: NEGATIVE
BUN SERPL-MCNC: 11 MG/DL (ref 8–23)
BUN/CREAT SERPL: 11.5 (ref 7–25)
CALCIUM SERPL-MCNC: 9.1 MG/DL (ref 8.6–10.5)
CASTS URNS MICRO: NORMAL
CHLORIDE SERPL-SCNC: 107 MMOL/L (ref 98–107)
CHOLEST SERPL-MCNC: 197 MG/DL (ref 0–200)
CO2 SERPL-SCNC: 24.9 MMOL/L (ref 22–29)
COLOR UR: YELLOW
CREAT SERPL-MCNC: 0.96 MG/DL (ref 0.76–1.27)
EOSINOPHIL # BLD AUTO: 0.14 10*3/MM3 (ref 0–0.4)
EOSINOPHIL NFR BLD AUTO: 2.7 % (ref 0.3–6.2)
EPI CELLS #/AREA URNS HPF: NORMAL /HPF
ERYTHROCYTE [DISTWIDTH] IN BLOOD BY AUTOMATED COUNT: 12.9 % (ref 12.3–15.4)
GLOBULIN SER CALC-MCNC: 2.2 GM/DL
GLUCOSE SERPL-MCNC: 110 MG/DL (ref 65–99)
GLUCOSE UR QL: NEGATIVE
HCT VFR BLD AUTO: 43.5 % (ref 37.5–51)
HDLC SERPL-MCNC: 48 MG/DL (ref 40–60)
HGB BLD-MCNC: 15 G/DL (ref 13–17.7)
HGB UR QL STRIP: NEGATIVE
IMM GRANULOCYTES # BLD AUTO: 0.01 10*3/MM3 (ref 0–0.05)
IMM GRANULOCYTES NFR BLD AUTO: 0.2 % (ref 0–0.5)
KETONES UR QL STRIP: NEGATIVE
LDLC SERPL CALC-MCNC: 127 MG/DL (ref 0–100)
LEUKOCYTE ESTERASE UR QL STRIP: NEGATIVE
LYMPHOCYTES # BLD AUTO: 1.66 10*3/MM3 (ref 0.7–3.1)
LYMPHOCYTES NFR BLD AUTO: 31.8 % (ref 19.6–45.3)
MCH RBC QN AUTO: 32.1 PG (ref 26.6–33)
MCHC RBC AUTO-ENTMCNC: 34.5 G/DL (ref 31.5–35.7)
MCV RBC AUTO: 93.1 FL (ref 79–97)
MONOCYTES # BLD AUTO: 0.37 10*3/MM3 (ref 0.1–0.9)
MONOCYTES NFR BLD AUTO: 7.1 % (ref 5–12)
NEUTROPHILS # BLD AUTO: 2.99 10*3/MM3 (ref 1.7–7)
NEUTROPHILS NFR BLD AUTO: 57.2 % (ref 42.7–76)
NITRITE UR QL STRIP: NEGATIVE
NRBC BLD AUTO-RTO: 0 /100 WBC (ref 0–0.2)
PH UR STRIP: 5.5 [PH] (ref 5–8)
PLATELET # BLD AUTO: 221 10*3/MM3 (ref 140–450)
POTASSIUM SERPL-SCNC: 4 MMOL/L (ref 3.5–5.2)
PROT SERPL-MCNC: 6.7 G/DL (ref 6–8.5)
PROT UR QL STRIP: NEGATIVE
PSA SERPL-MCNC: 1.62 NG/ML (ref 0–4)
RBC # BLD AUTO: 4.67 10*6/MM3 (ref 4.14–5.8)
RBC #/AREA URNS HPF: NORMAL /HPF
SODIUM SERPL-SCNC: 142 MMOL/L (ref 136–145)
SP GR UR: 1.01 (ref 1–1.03)
TRIGL SERPL-MCNC: 110 MG/DL (ref 0–150)
UROBILINOGEN UR STRIP-MCNC: NORMAL MG/DL
VLDLC SERPL CALC-MCNC: 22 MG/DL
WBC # BLD AUTO: 5.22 10*3/MM3 (ref 3.4–10.8)
WBC #/AREA URNS HPF: NORMAL /HPF

## 2020-09-15 ENCOUNTER — OFFICE VISIT (OUTPATIENT)
Dept: FAMILY MEDICINE CLINIC | Facility: CLINIC | Age: 65
End: 2020-09-15

## 2020-09-15 VITALS
SYSTOLIC BLOOD PRESSURE: 110 MMHG | BODY MASS INDEX: 32.64 KG/M2 | HEIGHT: 72 IN | HEART RATE: 62 BPM | WEIGHT: 241 LBS | OXYGEN SATURATION: 98 % | TEMPERATURE: 98.5 F | DIASTOLIC BLOOD PRESSURE: 76 MMHG

## 2020-09-15 DIAGNOSIS — N40.1 BENIGN PROSTATIC HYPERPLASIA WITH URINARY HESITANCY: ICD-10-CM

## 2020-09-15 DIAGNOSIS — E78.41 ELEVATED LIPOPROTEIN(A): Primary | ICD-10-CM

## 2020-09-15 DIAGNOSIS — R39.11 BENIGN PROSTATIC HYPERPLASIA WITH URINARY HESITANCY: ICD-10-CM

## 2020-09-15 PROCEDURE — 99214 OFFICE O/P EST MOD 30 MIN: CPT | Performed by: FAMILY MEDICINE

## 2020-09-15 RX ORDER — TAMSULOSIN HYDROCHLORIDE 0.4 MG/1
1 CAPSULE ORAL DAILY
COMMUNITY

## 2020-09-15 NOTE — PROGRESS NOTES
Herb Jack is a 65 y.o. male.     Chief Complaint   Patient presents with   • Hyperlipidemia     follow up after blood work to talk about chlesterol treatment i wore mask and face shield        HPI     Pt is a pleasant 65 y.o. YO male here for hyperlipidemia.  Patient recently had labs showing relatively normal lipids with a mildly elevated LDL at 127 but ASCVD of 15.3%.  He does not have a prior cardiac history, no issues with chest pain, palpitations or shortness of breath.  He does have a history of factor V Leiden with history of DVT but otherwise has been in good health.  Eats a healthy diet and exercises regularly.    Benign proximal hyperplasia suspected with some urinary hesitancy.  He has been taking Flomax 0.4 mg daily with some improvement but has not had resolution of symptoms.  He would like to continue his course of medications for the next 10 days before deciding if he wants to discontinue medication or not.    The following portions of the patient's history were reviewed and updated as appropriate: allergies, current medications, past family history, past medical history, past social history, past surgical history and problem list.    Review of Systems   Constitutional: Negative.    HENT: Negative.    Eyes: Negative.    Respiratory: Negative.    Cardiovascular: Negative for chest pain, palpitations and leg swelling.   Gastrointestinal: Negative.    Genitourinary: Negative.    Musculoskeletal: Negative.    Skin: Negative.    Allergic/Immunologic: Negative.    Neurological: Negative.    Hematological: Negative.    Psychiatric/Behavioral: Negative.        Objective  Vitals:    09/15/20 0828   BP: 110/76   Pulse: 62   Temp: 98.5 °F (36.9 °C)   SpO2: 98%        Physical Exam  Vitals signs and nursing note reviewed.   Constitutional:       General: He is not in acute distress.     Appearance: He is well-developed.   HENT:      Head: Normocephalic.      Nose: Nose normal.   Cardiovascular:       Rate and Rhythm: Normal rate and regular rhythm.      Heart sounds: Normal heart sounds. No murmur.   Pulmonary:      Effort: Pulmonary effort is normal. No respiratory distress.      Breath sounds: Normal breath sounds.   Musculoskeletal: Normal range of motion.   Skin:     General: Skin is warm and dry.      Findings: No rash.   Neurological:      Mental Status: He is alert and oriented to person, place, and time.   Psychiatric:         Behavior: Behavior normal.         Thought Content: Thought content normal.         Judgment: Judgment normal.           Current Outpatient Medications:   •  apixaban (Eliquis) 5 MG tablet tablet, Take 1 tablet by mouth Every 12 (Twelve) Hours., Disp: 180 tablet, Rfl: 3  •  Multiple Vitamins-Minerals (MULTI COMPLETE PO), Take 1 capsule by mouth Every Morning., Disp: , Rfl:   •  omeprazole (priLOSEC) 40 MG capsule, Take 1 capsule by mouth Daily., Disp: 30 capsule, Rfl: 5  •  sildenafil (REVATIO) 20 MG tablet, TAKE UP TO 3 TABLETS BY MOUTH DAILY AS DIRECTED, Disp: 90 tablet, Rfl: 0  •  tamsulosin (FLOMAX) 0.4 MG capsule 24 hr capsule, Take 1 capsule by mouth Daily., Disp: , Rfl:     Procedures    Lab Results (most recent)     None              Herb was seen today for hyperlipidemia.    Diagnoses and all orders for this visit:    Elevated lipoprotein(a)    Benign prostatic hyperplasia with urinary hesitancy      Hyperlipidemia not well controlled with ASCVD of 15%.  We had a long discussion about his cardiovascular risk.  He is on Eliquis for factor V Leiden and recurrent DVT.  He feels that his cardiac risk is lowered by taking Eliquis.  However, to my knowledge-Eliquis does not stabilize plaque formation.  He does not want to take a statin at this time.  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    Benign prostate hyperplasia that is not well controlled.  He is on  tamsulosin 0.4 mg daily with minimal improvement.  He would like to continue the next 10 days and discontinue medication to see if there was a difference made.  He is welcome to refill the tamsulosin if needed or we can go up to 0.8 mg daily.  For symptoms are not improving with this we would likely need to have him follow-up with a urologist.  PSA was normal at 1.62, last years was 2.5.    Return in about 7 months (around 4/15/2021), or if symptoms worsen or fail to improve, for Annual physical fating labs prior .      Hannah Cash MD    Mask and face shield with appropriate PPE worn during patient encounter.  Answers for HPI/ROS submitted by the patient on 9/15/2020   What is the primary reason for your visit?: Other  Please describe your symptoms.: None. Follow up visit  Have you had these symptoms before?: No  How long have you been having these symptoms?: 1-4 days  Please list any medications you are currently taking for this condition.: NA  Please describe any probable cause for these symptoms. : NA

## 2020-12-22 RX ORDER — SILDENAFIL CITRATE 20 MG/1
TABLET ORAL
Qty: 30 TABLET | Refills: 0 | Status: SHIPPED | OUTPATIENT
Start: 2020-12-22 | End: 2021-01-04

## 2021-01-04 RX ORDER — SILDENAFIL CITRATE 20 MG/1
TABLET ORAL
Qty: 30 TABLET | Refills: 0 | Status: SHIPPED | OUTPATIENT
Start: 2021-01-04 | End: 2021-01-19

## 2021-01-19 RX ORDER — SILDENAFIL CITRATE 20 MG/1
TABLET ORAL
Qty: 30 TABLET | Refills: 0 | Status: SHIPPED | OUTPATIENT
Start: 2021-01-19 | End: 2021-01-27

## 2021-01-27 RX ORDER — SILDENAFIL CITRATE 20 MG/1
TABLET ORAL
Qty: 30 TABLET | Refills: 0 | Status: SHIPPED | OUTPATIENT
Start: 2021-01-27 | End: 2021-07-06

## 2021-04-13 DIAGNOSIS — E78.49 OTHER HYPERLIPIDEMIA: ICD-10-CM

## 2021-04-13 DIAGNOSIS — E78.41 ELEVATED LIPOPROTEIN(A): Primary | ICD-10-CM

## 2021-04-13 DIAGNOSIS — E55.9 HYPOVITAMINOSIS D: ICD-10-CM

## 2021-04-13 DIAGNOSIS — Z12.5 SCREENING PSA (PROSTATE SPECIFIC ANTIGEN): ICD-10-CM

## 2021-04-13 DIAGNOSIS — N40.1 BENIGN PROSTATIC HYPERPLASIA WITH URINARY HESITANCY: ICD-10-CM

## 2021-04-13 DIAGNOSIS — I82.532 CHRONIC DEEP VEIN THROMBOSIS (DVT) OF POPLITEAL VEIN OF LEFT LOWER EXTREMITY (HCC): ICD-10-CM

## 2021-04-13 DIAGNOSIS — R39.11 BENIGN PROSTATIC HYPERPLASIA WITH URINARY HESITANCY: ICD-10-CM

## 2021-04-21 LAB
25(OH)D3+25(OH)D2 SERPL-MCNC: 41.2 NG/ML (ref 30–100)
ALBUMIN SERPL-MCNC: 4.3 G/DL (ref 3.5–5.2)
ALBUMIN/GLOB SERPL: 1.7 G/DL
ALP SERPL-CCNC: 52 U/L (ref 39–117)
ALT SERPL-CCNC: 20 U/L (ref 1–41)
AST SERPL-CCNC: 28 U/L (ref 1–40)
BASOPHILS # BLD AUTO: 0.07 10*3/MM3 (ref 0–0.2)
BASOPHILS NFR BLD AUTO: 1.3 % (ref 0–1.5)
BILIRUB SERPL-MCNC: 0.6 MG/DL (ref 0–1.2)
BUN SERPL-MCNC: 15 MG/DL (ref 8–23)
BUN/CREAT SERPL: 16.9 (ref 7–25)
CALCIUM SERPL-MCNC: 9.6 MG/DL (ref 8.6–10.5)
CHLORIDE SERPL-SCNC: 102 MMOL/L (ref 98–107)
CHOLEST SERPL-MCNC: 223 MG/DL (ref 0–200)
CO2 SERPL-SCNC: 25.7 MMOL/L (ref 22–29)
CREAT SERPL-MCNC: 0.89 MG/DL (ref 0.76–1.27)
EOSINOPHIL # BLD AUTO: 0.21 10*3/MM3 (ref 0–0.4)
EOSINOPHIL NFR BLD AUTO: 3.8 % (ref 0.3–6.2)
ERYTHROCYTE [DISTWIDTH] IN BLOOD BY AUTOMATED COUNT: 12.7 % (ref 12.3–15.4)
FT4I SERPL CALC-MCNC: 1.6 (ref 1.2–4.9)
GLOBULIN SER CALC-MCNC: 2.5 GM/DL
GLUCOSE SERPL-MCNC: 101 MG/DL (ref 65–99)
HCT VFR BLD AUTO: 44.5 % (ref 37.5–51)
HDLC SERPL-MCNC: 50 MG/DL (ref 40–60)
HGB BLD-MCNC: 15.3 G/DL (ref 13–17.7)
IMM GRANULOCYTES # BLD AUTO: 0.01 10*3/MM3 (ref 0–0.05)
IMM GRANULOCYTES NFR BLD AUTO: 0.2 % (ref 0–0.5)
LDLC SERPL CALC-MCNC: 150 MG/DL (ref 0–100)
LDLC/HDLC SERPL: 2.94 {RATIO}
LYMPHOCYTES # BLD AUTO: 1.99 10*3/MM3 (ref 0.7–3.1)
LYMPHOCYTES NFR BLD AUTO: 36 % (ref 19.6–45.3)
MCH RBC QN AUTO: 31.7 PG (ref 26.6–33)
MCHC RBC AUTO-ENTMCNC: 34.4 G/DL (ref 31.5–35.7)
MCV RBC AUTO: 92.3 FL (ref 79–97)
MONOCYTES # BLD AUTO: 0.52 10*3/MM3 (ref 0.1–0.9)
MONOCYTES NFR BLD AUTO: 9.4 % (ref 5–12)
NEUTROPHILS # BLD AUTO: 2.73 10*3/MM3 (ref 1.7–7)
NEUTROPHILS NFR BLD AUTO: 49.3 % (ref 42.7–76)
NRBC BLD AUTO-RTO: 0.2 /100 WBC (ref 0–0.2)
PLATELET # BLD AUTO: 217 10*3/MM3 (ref 140–450)
POTASSIUM SERPL-SCNC: 4.1 MMOL/L (ref 3.5–5.2)
PROT SERPL-MCNC: 6.8 G/DL (ref 6–8.5)
PSA SERPL-MCNC: 2.21 NG/ML (ref 0–4)
RBC # BLD AUTO: 4.82 10*6/MM3 (ref 4.14–5.8)
SODIUM SERPL-SCNC: 138 MMOL/L (ref 136–145)
T3RU NFR SERPL: 27 % (ref 24–39)
T4 SERPL-MCNC: 5.9 UG/DL (ref 4.5–12)
TRIGL SERPL-MCNC: 129 MG/DL (ref 0–150)
TSH SERPL DL<=0.005 MIU/L-ACNC: 1.97 UIU/ML (ref 0.45–4.5)
VLDLC SERPL CALC-MCNC: 23 MG/DL (ref 5–40)
WBC # BLD AUTO: 5.53 10*3/MM3 (ref 3.4–10.8)

## 2021-04-27 ENCOUNTER — OFFICE VISIT (OUTPATIENT)
Dept: FAMILY MEDICINE CLINIC | Facility: CLINIC | Age: 66
End: 2021-04-27

## 2021-04-27 VITALS
DIASTOLIC BLOOD PRESSURE: 76 MMHG | OXYGEN SATURATION: 99 % | WEIGHT: 245 LBS | TEMPERATURE: 97.8 F | HEART RATE: 67 BPM | SYSTOLIC BLOOD PRESSURE: 124 MMHG | BODY MASS INDEX: 33.18 KG/M2 | HEIGHT: 72 IN

## 2021-04-27 DIAGNOSIS — Z12.11 SCREEN FOR COLON CANCER: ICD-10-CM

## 2021-04-27 DIAGNOSIS — Z13.6 SCREENING, ISCHEMIC HEART DISEASE: ICD-10-CM

## 2021-04-27 DIAGNOSIS — Z00.00 HEALTH MAINTENANCE EXAMINATION: Primary | ICD-10-CM

## 2021-04-27 PROCEDURE — 99396 PREV VISIT EST AGE 40-64: CPT | Performed by: FAMILY MEDICINE

## 2021-04-27 PROCEDURE — 93000 ELECTROCARDIOGRAM COMPLETE: CPT | Performed by: FAMILY MEDICINE

## 2021-04-27 NOTE — PROGRESS NOTES
"Chief Complaint  Annual Exam (no complains )    Subjective          Herb Jack presents to [facility]  History of Present Illness  Annual Exam.      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  Eats well.  pescatarian   Immunizations: UTD, discussed tdap   Colon cancer screening: due for cologuard   Sleep/mental health: doing well     Objective   Vital Signs:   /76   Pulse 67   Temp 97.8 °F (36.6 °C)   Ht 182.9 cm (72\")   Wt 111 kg (245 lb)   SpO2 99%   BMI 33.23 kg/m²     Physical Exam  Vitals and nursing note reviewed.   Constitutional:       General: He is not in acute distress.     Appearance: He is well-developed.   HENT:      Head: Normocephalic.      Nose: Nose normal.   Cardiovascular:      Rate and Rhythm: Normal rate and regular rhythm.      Heart sounds: Normal heart sounds. No murmur heard.     Pulmonary:      Effort: Pulmonary effort is normal. No respiratory distress.      Breath sounds: Normal breath sounds.   Musculoskeletal:         General: Normal range of motion.   Skin:     General: Skin is warm and dry.      Findings: No rash.   Neurological:      Mental Status: He is alert and oriented to person, place, and time.   Psychiatric:         Behavior: Behavior normal.         Thought Content: Thought content normal.         Judgment: Judgment normal.        Result Review :            ECG 12 Lead    Date/Time: 4/27/2021 10:31 AM  Performed by: Hannah Cash MD  Authorized by: Hannah Cash MD   Rhythm: sinus rhythm  Rate: normal  Conduction: conduction normal  ST Segments: ST " segments normal  T Waves: T waves normal  QRS axis: normal  Other: no other findings    Clinical impression: normal ECG              Assessment and Plan    Diagnoses and all orders for this visit:    1. Health maintenance examination (Primary)    2. Screen for colon cancer  -     Cologuard - Stool, Per Rectum; Future    3. Screening, ischemic heart disease  -     ECG 12 Lead    Here for annual exam, fasting labs discussed with patient, immunizations up-to-date, colon cancer screening ordered as above, cardiovascular screening performed as above, EKG stable from 2016 -no symptoms, on statin and Eliquis.  Tinea blood pressure control, counseled patient to increase vegetable intake, water and 150 minutes of exercise weekly combining weightbearing exercises and aerobic activity.  He plans to get a  this year, already eating a mostly pescatarian diet.  Having some erectile dysfunction that is stable, some urinary hesitancy.  Discussed continue tamsulosin, okay to refer to urology if he would like.  Will wait on this.  Also okay to do stress test although his risk is extremely low and already on preventative treatment.  Plan to continue with same.  Recommended Tdap vaccine, will see if this is affordable.  Overall doing well.        Follow Up   Return in about 6 months (around 10/27/2021), or if symptoms worsen or fail to improve, for Recheck DVT on blood thinners with CMP and CBC prior.  Patient was given instructions and counseling regarding his condition or for health maintenance advice. Please see specific information pulled into the AVS if appropriate. Medical assistant and I wore mask and eyewear protection during entire encounter.  Patient wore mask.    Hannah Cash MD

## 2021-07-06 RX ORDER — SILDENAFIL CITRATE 20 MG/1
TABLET ORAL
Qty: 30 TABLET | Refills: 0 | Status: SHIPPED | OUTPATIENT
Start: 2021-07-06 | End: 2021-07-15

## 2021-07-15 RX ORDER — SILDENAFIL CITRATE 20 MG/1
TABLET ORAL
Qty: 30 TABLET | Refills: 0 | Status: SHIPPED | OUTPATIENT
Start: 2021-07-15 | End: 2021-09-08

## 2021-08-30 DIAGNOSIS — D68.51 FACTOR V LEIDEN MUTATION (HCC): ICD-10-CM

## 2021-08-30 DIAGNOSIS — I82.532 CHRONIC DEEP VEIN THROMBOSIS (DVT) OF POPLITEAL VEIN OF LEFT LOWER EXTREMITY (HCC): ICD-10-CM

## 2021-08-30 RX ORDER — APIXABAN 5 MG/1
TABLET, FILM COATED ORAL
Qty: 180 TABLET | Refills: 3 | Status: SHIPPED | OUTPATIENT
Start: 2021-08-30 | End: 2022-08-30 | Stop reason: SDUPTHER

## 2021-08-30 NOTE — TELEPHONE ENCOUNTER
Rx Refill Note  Requested Prescriptions     Pending Prescriptions Disp Refills   • Eliquis 5 MG tablet tablet [Pharmacy Med Name: ELIQUIS 5 MG TABLET] 180 tablet 3     Sig: TAKE ONE TABLET BY MOUTH EVERY 12 HOURS      Last office visit with prescribing clinician: 4/27/2021      Next office visit with prescribing clinician: Visit date not found            Radames Resendiz MA  08/30/21, 08:09 EDT

## 2021-09-02 ENCOUNTER — TELEPHONE (OUTPATIENT)
Dept: FAMILY MEDICINE CLINIC | Facility: CLINIC | Age: 66
End: 2021-09-02

## 2021-09-02 NOTE — TELEPHONE ENCOUNTER
Caller: Herb Jack    Relationship to patient: Self    Best call back number: [phone] (OFFICE) OR CELL [phone]    Patient is needing:PATIENT STATES NII PENA HE RECEIVED LAST YEAR IS  AND IS WANTING TO KNOW IF HE CAN STOP BY OFFICE TODAY  AND PICK ANOTHER ONE UP.

## 2021-09-08 RX ORDER — SILDENAFIL CITRATE 20 MG/1
TABLET ORAL
Qty: 30 TABLET | Refills: 0 | Status: SHIPPED | OUTPATIENT
Start: 2021-09-08 | End: 2021-09-20

## 2021-09-20 RX ORDER — SILDENAFIL CITRATE 20 MG/1
TABLET ORAL
Qty: 30 TABLET | Refills: 3 | Status: SHIPPED | OUTPATIENT
Start: 2021-09-20

## 2021-09-20 NOTE — TELEPHONE ENCOUNTER
Rx Refill Note  Requested Prescriptions     Pending Prescriptions Disp Refills   • sildenafil (REVATIO) 20 MG tablet [Pharmacy Med Name: SILDENAFIL 20 MG TABLET] 30 tablet 0     Sig: TAKE UP TO 3 TABLETS BY MOUTH DAILY AS DIRECTED      Last office visit with prescribing clinician: 4/27/2021      Next office visit with prescribing clinician: Visit date not found            Trisah Grubbs MA  09/20/21, 11:24 EDT

## 2022-08-30 ENCOUNTER — OFFICE VISIT (OUTPATIENT)
Dept: FAMILY MEDICINE CLINIC | Facility: CLINIC | Age: 67
End: 2022-08-30

## 2022-08-30 VITALS
DIASTOLIC BLOOD PRESSURE: 80 MMHG | HEIGHT: 72 IN | TEMPERATURE: 97.9 F | WEIGHT: 238 LBS | SYSTOLIC BLOOD PRESSURE: 132 MMHG | HEART RATE: 62 BPM | BODY MASS INDEX: 32.23 KG/M2 | OXYGEN SATURATION: 99 %

## 2022-08-30 DIAGNOSIS — Z23 NEED FOR VACCINATION: ICD-10-CM

## 2022-08-30 DIAGNOSIS — D68.51 FACTOR V LEIDEN MUTATION: ICD-10-CM

## 2022-08-30 DIAGNOSIS — R39.11 BENIGN PROSTATIC HYPERPLASIA WITH URINARY HESITANCY: ICD-10-CM

## 2022-08-30 DIAGNOSIS — N40.1 BENIGN PROSTATIC HYPERPLASIA WITH URINARY HESITANCY: ICD-10-CM

## 2022-08-30 DIAGNOSIS — I82.532 CHRONIC DEEP VEIN THROMBOSIS (DVT) OF POPLITEAL VEIN OF LEFT LOWER EXTREMITY: Primary | ICD-10-CM

## 2022-08-30 DIAGNOSIS — E78.49 OTHER HYPERLIPIDEMIA: ICD-10-CM

## 2022-08-30 DIAGNOSIS — Z12.5 SCREENING PSA (PROSTATE SPECIFIC ANTIGEN): ICD-10-CM

## 2022-08-30 PROCEDURE — 90471 IMMUNIZATION ADMIN: CPT | Performed by: FAMILY MEDICINE

## 2022-08-30 PROCEDURE — 90677 PCV20 VACCINE IM: CPT | Performed by: FAMILY MEDICINE

## 2022-08-30 PROCEDURE — 99214 OFFICE O/P EST MOD 30 MIN: CPT | Performed by: FAMILY MEDICINE

## 2022-08-30 NOTE — PROGRESS NOTES
"Chief Complaint  Coagulation Disorder (Dvt follow up  would like to talk about elequis ,pt is fasting )    Subjective        Herb Jack presents to [facility]  History of Present Illness  Pleasant 67-year-old male here to follow-up for follow-up hypercoagulability with history of DVT, frequent urination with some pressure and hyperlipidemia which have all been well controlled.  H    istory of chronic DVT (he has had 2 DVTs in the past) and known history of factor V Leiden mutation.  He was on warfarin previously and transition to Eliquis in 2019.    He does have known benign prostate hyperplasia-he was taking tamsulosin 0.5 mg daily but did not have substantial improvement with this.  He would like to see a urologist for further evaluation.  We discussed various practices he could see.  No burning dysuria, no blood.    Objective   Vital Signs:  /80   Pulse 62   Temp 97.9 °F (36.6 °C)   Ht 182.9 cm (72\")   Wt 108 kg (238 lb)   SpO2 99%   BMI 32.28 kg/m²   Estimated body mass index is 32.28 kg/m² as calculated from the following:    Height as of this encounter: 182.9 cm (72\").    Weight as of this encounter: 108 kg (238 lb).          Physical Exam  Vitals and nursing note reviewed.   Constitutional:       General: He is not in acute distress.     Appearance: He is well-developed.   HENT:      Head: Normocephalic.      Nose: Nose normal.   Cardiovascular:      Rate and Rhythm: Normal rate and regular rhythm.      Heart sounds: Normal heart sounds. No murmur heard.  Pulmonary:      Effort: Pulmonary effort is normal. No respiratory distress.      Breath sounds: Normal breath sounds.   Musculoskeletal:         General: Normal range of motion.   Skin:     General: Skin is warm and dry.      Findings: No rash.   Neurological:      Mental Status: He is alert and oriented to person, place, and time.   Psychiatric:         Behavior: Behavior normal.         Thought Content: " Thought content normal.         Judgment: Judgment normal.        Result Review :                Assessment and Plan   Diagnoses and all orders for this visit:    1. Chronic deep vein thrombosis (DVT) of popliteal vein of left lower extremity (HCC) (Primary)  -     Comprehensive Metabolic Panel  -     CBC & Differential  -     apixaban (Eliquis) 5 MG tablet tablet; Take 1 tablet by mouth Every 12 (Twelve) Hours.  Dispense: 180 tablet; Refill: 3    2. Factor V Leiden mutation (HCC)  -     Comprehensive Metabolic Panel  -     CBC & Differential  -     apixaban (Eliquis) 5 MG tablet tablet; Take 1 tablet by mouth Every 12 (Twelve) Hours.  Dispense: 180 tablet; Refill: 3    3. Other hyperlipidemia  -     Lipid Panel    4. Benign prostatic hyperplasia with urinary hesitancy  -     Ambulatory Referral to Urology    5. Screening PSA (prostate specific antigen)  -     PSA SCREENING    6. Need for vaccination  -     Pneumococcal Conjugate Vaccine 20-Valent All    Very pleasant 67-year-old male here to follow-up for history of recurrent DVT and factor V Leiden mutation.  He is currently on Eliquis 5 mg twice daily, no adverse effects of bleeding.  He did have an INR previously that was very difficult to control.  He was transitioned to Eliquis in 2019.  Plan to continue with same, labs as above and follow-up in 6 months    Hyperlipidemia well-controlled off meds, his blood pressure is also slightly on the elevated range as well.  I did recommend that he start checking his blood pressure once a week, goal blood pressure less than 130/90.  His wife is a nurse and can check his blood pressure.    Increased urinary frequency with hesitancy, okay to follow-up with urology, referral placed.    Labs prior to next appointment, same as above except for PSA (once per 12 month bit i'll continue to do them).       Follow Up   Return in about 6 months (around 2/28/2023), or if symptoms worsen or fail to improve, for Annual Exam with  fasting labs prior.  Patient was given instructions and counseling regarding his condition or for health maintenance advice. Please see specific information pulled into the AVS if appropriate. Medical assistant and I wore mask and eyewear protection during entire encounter.  Patient wore mask.    Hannah Cash MD

## 2022-08-31 LAB
ALBUMIN SERPL-MCNC: 4.7 G/DL (ref 3.5–5.2)
ALBUMIN/GLOB SERPL: 2.2 G/DL
ALP SERPL-CCNC: 57 U/L (ref 39–117)
ALT SERPL-CCNC: 18 U/L (ref 1–41)
AST SERPL-CCNC: 25 U/L (ref 1–40)
BASOPHILS # BLD AUTO: 0.06 10*3/MM3 (ref 0–0.2)
BASOPHILS NFR BLD AUTO: 1.1 % (ref 0–1.5)
BILIRUB SERPL-MCNC: 0.6 MG/DL (ref 0–1.2)
BUN SERPL-MCNC: 10 MG/DL (ref 8–23)
BUN/CREAT SERPL: 11 (ref 7–25)
CALCIUM SERPL-MCNC: 9.8 MG/DL (ref 8.6–10.5)
CHLORIDE SERPL-SCNC: 105 MMOL/L (ref 98–107)
CHOLEST SERPL-MCNC: 215 MG/DL (ref 0–200)
CO2 SERPL-SCNC: 29 MMOL/L (ref 22–29)
CREAT SERPL-MCNC: 0.91 MG/DL (ref 0.76–1.27)
EGFRCR-CYS SERPLBLD CKD-EPI 2021: 93 ML/MIN/1.73
EOSINOPHIL # BLD AUTO: 0.28 10*3/MM3 (ref 0–0.4)
EOSINOPHIL NFR BLD AUTO: 5.1 % (ref 0.3–6.2)
ERYTHROCYTE [DISTWIDTH] IN BLOOD BY AUTOMATED COUNT: 13.7 % (ref 12.3–15.4)
GLOBULIN SER CALC-MCNC: 2.1 GM/DL
GLUCOSE SERPL-MCNC: 96 MG/DL (ref 65–99)
HCT VFR BLD AUTO: 48.6 % (ref 37.5–51)
HDLC SERPL-MCNC: 54 MG/DL (ref 40–60)
HGB BLD-MCNC: 16.4 G/DL (ref 13–17.7)
IMM GRANULOCYTES # BLD AUTO: 0.01 10*3/MM3 (ref 0–0.05)
IMM GRANULOCYTES NFR BLD AUTO: 0.2 % (ref 0–0.5)
LDLC SERPL CALC-MCNC: 143 MG/DL (ref 0–100)
LYMPHOCYTES # BLD AUTO: 1.95 10*3/MM3 (ref 0.7–3.1)
LYMPHOCYTES NFR BLD AUTO: 35.4 % (ref 19.6–45.3)
MCH RBC QN AUTO: 32.2 PG (ref 26.6–33)
MCHC RBC AUTO-ENTMCNC: 33.7 G/DL (ref 31.5–35.7)
MCV RBC AUTO: 95.5 FL (ref 79–97)
MONOCYTES # BLD AUTO: 0.5 10*3/MM3 (ref 0.1–0.9)
MONOCYTES NFR BLD AUTO: 9.1 % (ref 5–12)
NEUTROPHILS # BLD AUTO: 2.71 10*3/MM3 (ref 1.7–7)
NEUTROPHILS NFR BLD AUTO: 49.1 % (ref 42.7–76)
NRBC BLD AUTO-RTO: 0 /100 WBC (ref 0–0.2)
PLATELET # BLD AUTO: 208 10*3/MM3 (ref 140–450)
POTASSIUM SERPL-SCNC: 4.9 MMOL/L (ref 3.5–5.2)
PROT SERPL-MCNC: 6.8 G/DL (ref 6–8.5)
PSA SERPL-MCNC: 3.27 NG/ML (ref 0–4)
RBC # BLD AUTO: 5.09 10*6/MM3 (ref 4.14–5.8)
SODIUM SERPL-SCNC: 142 MMOL/L (ref 136–145)
TRIGL SERPL-MCNC: 100 MG/DL (ref 0–150)
VLDLC SERPL CALC-MCNC: 18 MG/DL (ref 5–40)
WBC # BLD AUTO: 5.51 10*3/MM3 (ref 3.4–10.8)

## 2022-12-07 ENCOUNTER — ANTICOAGULATION VISIT (OUTPATIENT)
Dept: FAMILY MEDICINE CLINIC | Facility: CLINIC | Age: 67
End: 2022-12-07

## 2023-03-01 DIAGNOSIS — E78.49 OTHER HYPERLIPIDEMIA: Primary | ICD-10-CM

## 2023-03-01 DIAGNOSIS — Z00.00 HEALTH MAINTENANCE EXAMINATION: ICD-10-CM

## 2023-03-01 DIAGNOSIS — Z13.6 SCREENING, ISCHEMIC HEART DISEASE: ICD-10-CM

## 2023-03-01 DIAGNOSIS — D68.51 FACTOR V LEIDEN MUTATION: ICD-10-CM

## 2023-03-09 LAB
ALBUMIN SERPL-MCNC: 4.2 G/DL (ref 3.5–5.2)
ALBUMIN/GLOB SERPL: 1.6 G/DL
ALP SERPL-CCNC: 57 U/L (ref 39–117)
ALT SERPL-CCNC: 22 U/L (ref 1–41)
AST SERPL-CCNC: 25 U/L (ref 1–40)
BASOPHILS # BLD AUTO: 0.04 10*3/MM3 (ref 0–0.2)
BASOPHILS NFR BLD AUTO: 0.8 % (ref 0–1.5)
BILIRUB SERPL-MCNC: 0.6 MG/DL (ref 0–1.2)
BUN SERPL-MCNC: 11 MG/DL (ref 8–23)
BUN/CREAT SERPL: 12.6 (ref 7–25)
CALCIUM SERPL-MCNC: 9.9 MG/DL (ref 8.6–10.5)
CHLORIDE SERPL-SCNC: 109 MMOL/L (ref 98–107)
CHOLEST SERPL-MCNC: 211 MG/DL (ref 0–200)
CO2 SERPL-SCNC: 29.6 MMOL/L (ref 22–29)
CREAT SERPL-MCNC: 0.87 MG/DL (ref 0.76–1.27)
EGFRCR SERPLBLD CKD-EPI 2021: 95.2 ML/MIN/1.73
EOSINOPHIL # BLD AUTO: 0.13 10*3/MM3 (ref 0–0.4)
EOSINOPHIL NFR BLD AUTO: 2.7 % (ref 0.3–6.2)
ERYTHROCYTE [DISTWIDTH] IN BLOOD BY AUTOMATED COUNT: 12.1 % (ref 12.3–15.4)
GLOBULIN SER CALC-MCNC: 2.6 GM/DL
GLUCOSE SERPL-MCNC: 109 MG/DL (ref 65–99)
HCT VFR BLD AUTO: 44.6 % (ref 37.5–51)
HDLC SERPL-MCNC: 49 MG/DL (ref 40–60)
HGB BLD-MCNC: 15 G/DL (ref 13–17.7)
IMM GRANULOCYTES # BLD AUTO: 0.01 10*3/MM3 (ref 0–0.05)
IMM GRANULOCYTES NFR BLD AUTO: 0.2 % (ref 0–0.5)
LDLC SERPL CALC-MCNC: 150 MG/DL (ref 0–100)
LDLC/HDLC SERPL: 3.04 {RATIO}
LYMPHOCYTES # BLD AUTO: 1.82 10*3/MM3 (ref 0.7–3.1)
LYMPHOCYTES NFR BLD AUTO: 37.5 % (ref 19.6–45.3)
MCH RBC QN AUTO: 31.7 PG (ref 26.6–33)
MCHC RBC AUTO-ENTMCNC: 33.6 G/DL (ref 31.5–35.7)
MCV RBC AUTO: 94.3 FL (ref 79–97)
MONOCYTES # BLD AUTO: 0.34 10*3/MM3 (ref 0.1–0.9)
MONOCYTES NFR BLD AUTO: 7 % (ref 5–12)
NEUTROPHILS # BLD AUTO: 2.51 10*3/MM3 (ref 1.7–7)
NEUTROPHILS NFR BLD AUTO: 51.8 % (ref 42.7–76)
NRBC BLD AUTO-RTO: 0 /100 WBC (ref 0–0.2)
PLATELET # BLD AUTO: 203 10*3/MM3 (ref 140–450)
POTASSIUM SERPL-SCNC: 4.6 MMOL/L (ref 3.5–5.2)
PROT SERPL-MCNC: 6.8 G/DL (ref 6–8.5)
RBC # BLD AUTO: 4.73 10*6/MM3 (ref 4.14–5.8)
SODIUM SERPL-SCNC: 146 MMOL/L (ref 136–145)
TRIGL SERPL-MCNC: 65 MG/DL (ref 0–150)
VLDLC SERPL CALC-MCNC: 12 MG/DL (ref 5–40)
WBC # BLD AUTO: 4.85 10*3/MM3 (ref 3.4–10.8)

## 2023-03-17 ENCOUNTER — OFFICE VISIT (OUTPATIENT)
Dept: FAMILY MEDICINE CLINIC | Facility: CLINIC | Age: 68
End: 2023-03-17
Payer: COMMERCIAL

## 2023-03-17 VITALS
TEMPERATURE: 98.7 F | HEIGHT: 72 IN | WEIGHT: 242 LBS | HEART RATE: 68 BPM | BODY MASS INDEX: 32.78 KG/M2 | OXYGEN SATURATION: 99 % | SYSTOLIC BLOOD PRESSURE: 124 MMHG | DIASTOLIC BLOOD PRESSURE: 70 MMHG

## 2023-03-17 DIAGNOSIS — Z13.1 SCREENING FOR DIABETES MELLITUS: ICD-10-CM

## 2023-03-17 DIAGNOSIS — Z13.0 SCREENING, ANEMIA, DEFICIENCY, IRON: ICD-10-CM

## 2023-03-17 DIAGNOSIS — Z12.5 SCREENING PSA (PROSTATE SPECIFIC ANTIGEN): ICD-10-CM

## 2023-03-17 DIAGNOSIS — Z13.220 SCREENING FOR LIPID DISORDERS: ICD-10-CM

## 2023-03-17 DIAGNOSIS — Z00.00 HEALTH MAINTENANCE EXAMINATION: Primary | ICD-10-CM

## 2023-03-17 PROCEDURE — 99397 PER PM REEVAL EST PAT 65+ YR: CPT | Performed by: FAMILY MEDICINE

## 2023-03-17 NOTE — PROGRESS NOTES
"Chief Complaint  Annual Exam (No complains doing well  a few problems with left shoulder and left leg but besides is doing fine )    Subjective        Herb Jack presents to [facility]  History of Present Illness  Annual Exam.    Health Maintenance   Topic Date Due   • LIPID PANEL  03/08/2024   • ANNUAL PHYSICAL  03/17/2024   • TDAP/TD VACCINES (3 - Td or Tdap) 08/10/2027   • COVID-19 Vaccine  Completed   • INFLUENZA VACCINE  Completed   • Pneumococcal Vaccine 65+  Completed   • HEPATITIS C SCREENING  Addressed   • ZOSTER VACCINE  Addressed   • COLORECTAL CANCER SCREENING  Discontinued     Diet: diet is great - he is very careful.  He does have mostly vegetarian, mostly veggies. Good fiber, does want to grow in water.   Exercise: He is exercising - walking a couple miles every other day.   Immunizations: UTD  Colon cancer screening: UTD jazmín in 2021  Sleep/mental health: Doing well   Dentist: [facility]   Derm: Has a derm friend   Vision UTD    He did hyperextend his left hip, and has been injured since November, significantly improved.  Doing home exercises and massage.  Feels about 80% better now not wanting further evaluation at this time but wants to make me aware.    Objective   Vital Signs:  /70   Pulse 68   Temp 98.7 °F (37.1 °C)   Ht 182.9 cm (72\")   Wt 110 kg (242 lb)   SpO2 99%   BMI 32.82 kg/m²   Estimated body mass index is 32.82 kg/m² as calculated from the following:    Height as of this encounter: 182.9 cm (72\").    Weight as of this encounter: 110 kg (242 lb).             Physical Exam  Vitals and nursing note reviewed.   Constitutional:       General: He is not in acute distress.     Appearance: Normal appearance. He is well-developed. He is not diaphoretic.   HENT:      Head: Normocephalic and atraumatic. Hair is normal.      Right Ear: Hearing, tympanic membrane, ear canal and external ear normal.      Left Ear: Hearing, tympanic membrane, ear canal " and external ear normal.      Nose: Nose normal. No nasal deformity.      Mouth/Throat:      Mouth: Mucous membranes are moist. No oral lesions.      Pharynx: Uvula midline. No uvula swelling.   Eyes:      General: Lids are normal. No scleral icterus.        Right eye: No discharge.         Left eye: No discharge.      Extraocular Movements: Extraocular movements intact.      Right eye: Normal extraocular motion and no nystagmus.      Left eye: Normal extraocular motion and no nystagmus.      Conjunctiva/sclera: Conjunctivae normal.      Pupils: Pupils are equal, round, and reactive to light.   Neck:      Thyroid: No thyromegaly.      Vascular: No JVD.   Cardiovascular:      Rate and Rhythm: Normal rate and regular rhythm.      Pulses: Normal pulses.      Heart sounds: Normal heart sounds. No murmur heard.    No gallop.   Pulmonary:      Effort: Pulmonary effort is normal. No respiratory distress.      Breath sounds: Normal breath sounds. No wheezing or rales.   Chest:      Chest wall: No tenderness.   Abdominal:      General: Bowel sounds are normal. There is no distension.      Palpations: Abdomen is soft. There is no mass.      Tenderness: There is no abdominal tenderness. There is no guarding.      Hernia: No hernia is present.   Musculoskeletal:         General: No tenderness or deformity. Normal range of motion.      Cervical back: Normal range of motion and neck supple.   Lymphadenopathy:      Cervical: No cervical adenopathy.   Skin:     General: Skin is warm and dry.      Findings: No rash.   Neurological:      Mental Status: He is alert and oriented to person, place, and time.      Cranial Nerves: No cranial nerve deficit.      Motor: No abnormal muscle tone.      Coordination: Coordination normal.      Deep Tendon Reflexes: Reflexes are normal and symmetric. Reflexes normal.   Psychiatric:         Mood and Affect: Mood normal.         Behavior: Behavior normal.         Thought Content: Thought content  normal.         Judgment: Judgment normal.        Result Review :  The following data was reviewed by: Hannah Gongora MD on 03/17/2023:  CMP    CMP 8/30/22 3/8/23   Glucose 96 109 (A)   BUN 10 11   Creatinine 0.91 0.87   Sodium 142 146 (A)   Potassium 4.9 4.6   Chloride 105 109 (A)   Calcium 9.8 9.9   Total Protein 6.8 6.8   Albumin 4.70 4.2   Globulin 2.1 2.6   Total Bilirubin 0.6 0.6   Alkaline Phosphatase 57 57   AST (SGOT) 25 25   ALT (SGPT) 18 22   BUN/Creatinine Ratio 11.0 12.6   (A) Abnormal value            CBC    CBC 8/30/22 3/8/23   WBC 5.51 4.85   RBC 5.09 4.73   Hemoglobin 16.4 15.0   Hematocrit 48.6 44.6   MCV 95.5 94.3   MCH 32.2 31.7   MCHC 33.7 33.6   RDW 13.7 12.1 (A)   Platelets 208 203   (A) Abnormal value            Lipid Panel    Lipid Panel 8/30/22 3/8/23   Total Cholesterol 215 (A) 211 (A)   Triglycerides 100 65   HDL Cholesterol 54 49   VLDL Cholesterol 18 12   LDL Cholesterol  143 (A) 150 (A)   LDL/HDL Ratio  3.04   (A) Abnormal value       Comments are available for some flowsheets but are not being displayed.           PSA    PSA 8/30/22   PSA 3.270             Calculated ASCVD of 13.1           Assessment and Plan   Diagnoses and all orders for this visit:    1. Health maintenance examination (Primary)    2. Screening PSA (prostate specific antigen)  -     PSA SCREENING; Future    3. Screening for diabetes mellitus  -     Comprehensive Metabolic Panel; Future    4. Screening for lipid disorders  -     Lipid Panel; Future    5. Screening, anemia, deficiency, iron  -     CBC & Differential; Future    Here for annual exam, fasting labs UTD, immunizations up-to-date, colon cancer screening UTD due next year, cardiovascular screening UTD, counseled patient to increase vegetable intake, water and 150 minutes of exercise weekly combining weightbearing exercises and aerobic activity.    HLD: Not well controlled although improving.  ASCVD of 13.1%.  I do recommend that we consider rosuvastatin 5  mg, I am very glad to hear that he is eating healthfully and exercising regularly.  We discussed this really is the foundation for reducing cardiovascular disease.  He is on Eliquis for factor V mutation and DVT but it does not help inhibit the atherosclerosis within the coronary arteries.  He is still hesitant to consider a statin, he would like to discuss this with his wife.  He will call me if he changes his mind.  We discussed other options for screening, I do not think a cardiac CT would be the best option as it does have limitations with not seeing the early lesions which are more liable and prone to cause an acute event, and it may overestimate more mature lesions.  We could consider stress testing but he is thankfully feeling well.  For now he would like to continue treating with lifestyle changes will call me if he changes his mind.       Follow Up   Return in about 6 months (around 9/17/2023), or if symptoms worsen or fail to improve, for Recheck DVT.  Patient was given instructions and counseling regarding his condition or for health maintenance advice. Please see specific information pulled into the AVS if appropriate. Medical assistant and I wore mask and eyewear protection during entire encounter.  Patient wore mask.    Hannah Gongora MD

## 2023-04-20 ENCOUNTER — TELEPHONE (OUTPATIENT)
Dept: FAMILY MEDICINE CLINIC | Facility: CLINIC | Age: 68
End: 2023-04-20
Payer: COMMERCIAL

## 2023-04-20 DIAGNOSIS — M25.552 LEFT HIP PAIN: Primary | ICD-10-CM

## 2023-04-20 NOTE — TELEPHONE ENCOUNTER
Pt called requesting a referral to Physical Therapy at [facility]    Stated they discussed this during his last appointment.

## 2023-04-20 NOTE — TELEPHONE ENCOUNTER
Let me check  With dr powers since I can not see on the last note  Thi was need it or the reason .

## 2023-04-20 NOTE — TELEPHONE ENCOUNTER
See text in the HPI, left hip.  I placed an order for to physical therapy, he will need to specify which group you will need to go to but it should not be a problem to go to [facility]

## 2023-05-10 ENCOUNTER — TREATMENT (OUTPATIENT)
Dept: PHYSICAL THERAPY | Facility: CLINIC | Age: 68
End: 2023-05-10
Payer: COMMERCIAL

## 2023-05-10 DIAGNOSIS — S76.012D STRAIN OF LEFT HIP, SUBSEQUENT ENCOUNTER: Primary | ICD-10-CM

## 2023-05-10 DIAGNOSIS — M25.552 PAIN OF LEFT HIP: ICD-10-CM

## 2023-05-10 NOTE — PROGRESS NOTES
Physical Therapy Initial Evaluation and Plan of Care  [facility] [place]   [address], [address]  [place] [address]  P: [phone]  F: [phone]    Patient: Herb Jack   : 1955  Diagnosis/ICD-10 Code:  Strain of left hip, subsequent encounter [S76.012D]  Referring practitioner: Hannah Gongora MD  Date of Initial Visit: 5/10/2023  Today's Date: 5/10/2023  Patient seen for 1 session         Visit Diagnoses:    ICD-10-CM ICD-9-CM   1. Strain of left hip, subsequent encounter  S76.012D V58.89     843.9   2. Pain of left hip  M25.552 719.45       PMHx Reviewed : 5/10/2023      Subjective Evaluation    History of Present Illness  Mechanism of injury: Pt presents to PT with (L) hip pain after a fall on steps 23.  Causing him to hyper abduct the (L) hip.  It was painful at the time.  He proceed to walk a 5k immediately afterwards.  He was painful by the end of walk.      He has been living with the pain since that time.  Notice the pain will increase with activity.          Occupation:   - 40 hrs wk  Activities:  Golf, Landscaping & yard work, plan to return to gym soon  PLOF: Independent  Medical Hx Reviewed.       Quality of life: excellent    Pain  Current pain ratin  At best pain ratin  At worst pain ratin  Location: (L) Hip ([place])  Quality: dull ache  Relieving factors: rest  Aggravating factors: squatting and stairs (Prolonged exercise >30 mins, rising from prolonged sitting)    Social Support  Lives in: multiple-level home  Lives with: spouse (dog)             Objective          Strength/Myotome Testing     Left Hip   Planes of Motion   Flexion: 4+  Abduction: 4  External rotation: 4  Internal rotation: 4-    Right Hip   Planes of Motion   Flexion: 5  Abduction: 5  External rotation: 5  Internal rotation: 5    Left Knee   Flexion: 5  Extension: 5    Right Knee   Flexion: 5  Extension: 5    Left Ankle/Foot    Dorsiflexion: 5    Right Ankle/Foot   Dorsiflexion: 5          Assessment & Plan     Assessment  Impairments: abnormal or restricted ROM, activity intolerance, impaired balance, impaired physical strength, lacks appropriate home exercise program, pain with function, safety issue and weight-bearing intolerance  Functional Limitations: lifting, pulling, pushing, uncomfortable because of pain, standing and stooping  Assessment details: Pt presents to PT with symptoms consistent with hip strain.  Pt would benefit from skilled PT intervention to address the deficits noted.   Prognosis: good    Goals  Plan Goals:  SHORT TERM GOALS:  1.  Patient to be compliant with HEP and demo good efficiency with TE  2.  Report < 2 sleep disturbances 2° hip pain.     3.  Increased Lumbar and SIJ mobility to allow for improved pelvic alignment and gait mechanics (equal step length and level pelvis throughout gait).    4.  Increased hip ER/IR ROM to WFL (IR to 30°) degrees to allow for increased ease with bed mobility and squatting.  5. Pt will report minimal-no tenderness to palpation with firm pressure.   6. Pt. Able to ambulate up to 60 min with pain < 2/10 with acceptable pattern.      LONG TERM GOALS:  1.  Pt. to score > 90% perceived ability on LEFS  2.  Pain level < 2/10 in hips with all activities including sitting > 1 hr continuously then rising.  3.  Hip  AROM to WNL to allow for return to ADL's/IADLS and functional activities without increased symptoms  4. Hip strength to 5/5  to allow for pushing, pulling and more strenuous activities to occur without pain.  Rising, squating, transfering with no pain  5. No palpable tenderness to the hip.         Plan  Therapy options: will be seen for skilled therapy services  Planned modality interventions: cryotherapy, electrical stimulation/Russian stimulation, iontophoresis, TENS, thermotherapy (hydrocollator packs), traction and ultrasound  Other planned modality interventions: Dry  Needling  Planned therapy interventions: abdominal trunk stabilization, ADL retraining, body mechanics training, balance/weight-bearing training, flexibility, functional ROM exercises, gait training, home exercise program, joint mobilization, manual therapy, neuromuscular re-education, postural training, soft tissue mobilization, spinal/joint mobilization, strengthening, stretching and therapeutic activities  Frequency: 2x week  Duration in weeks: 8  Treatment plan discussed with: patient          Manual Therapy:          mins  75160;  Therapeutic Exercise:     15     mins  02101;     Neuromuscular Ryan:           mins  41187;    Therapeutic Activity:      10     mins  51555;     Gait Training:            mins  75688;     Ultrasound:           mins  38636;    Electrical Stimulation:          mins  11818 ( );  Dry Needling           mins self-pay  Traction           mins 52423  Canalith Repositioning         mins [id number]      Timed Treatment:   25   mins   Total Treatment:     65   mins      PT: To Abernathy PT     License Number: [id number]  Electronically signed by To Abernathy PT, 05/10/23, 8:14 AM EDT    Certification Period: 5/10/2023 thru 8/7/2023  I certify that the therapy services are furnished while this patient is under my care.  The services outlined above are required by this patient, and will be reviewed every 90 days.         Physician Signature:__________________________________________________    PHYSICIAN: Hannah Gongora MD  NPI: [id number]                                      DATE:      Please sign in Epic or return via fax to [email or web address] . Thank you, [facility].

## 2023-08-31 DIAGNOSIS — I82.532 CHRONIC DEEP VEIN THROMBOSIS (DVT) OF POPLITEAL VEIN OF LEFT LOWER EXTREMITY: ICD-10-CM

## 2023-08-31 DIAGNOSIS — D68.51 FACTOR V LEIDEN MUTATION: ICD-10-CM

## 2023-08-31 RX ORDER — APIXABAN 5 MG/1
TABLET, FILM COATED ORAL
Qty: 180 TABLET | Refills: 3 | Status: SHIPPED | OUTPATIENT
Start: 2023-08-31

## 2023-10-24 ENCOUNTER — OFFICE VISIT (OUTPATIENT)
Dept: FAMILY MEDICINE CLINIC | Facility: CLINIC | Age: 68
End: 2023-10-24
Payer: COMMERCIAL

## 2023-10-24 VITALS
WEIGHT: 245 LBS | HEART RATE: 60 BPM | SYSTOLIC BLOOD PRESSURE: 128 MMHG | DIASTOLIC BLOOD PRESSURE: 74 MMHG | TEMPERATURE: 97.6 F | OXYGEN SATURATION: 99 % | HEIGHT: 72 IN | BODY MASS INDEX: 33.18 KG/M2

## 2023-10-24 DIAGNOSIS — I82.532 CHRONIC DEEP VEIN THROMBOSIS (DVT) OF POPLITEAL VEIN OF LEFT LOWER EXTREMITY: Primary | ICD-10-CM

## 2023-10-24 DIAGNOSIS — N40.1 BENIGN PROSTATIC HYPERPLASIA WITH URINARY FREQUENCY: ICD-10-CM

## 2023-10-24 DIAGNOSIS — Z13.6 SCREENING, ISCHEMIC HEART DISEASE: ICD-10-CM

## 2023-10-24 DIAGNOSIS — R35.0 BENIGN PROSTATIC HYPERPLASIA WITH URINARY FREQUENCY: ICD-10-CM

## 2023-10-24 DIAGNOSIS — Z23 NEED FOR VACCINATION: ICD-10-CM

## 2023-10-24 DIAGNOSIS — D68.51 FACTOR V LEIDEN MUTATION: ICD-10-CM

## 2023-10-24 DIAGNOSIS — E78.49 OTHER HYPERLIPIDEMIA: ICD-10-CM

## 2023-10-24 RX ORDER — TADALAFIL 5 MG/1
5 TABLET ORAL DAILY PRN
COMMUNITY
Start: 2023-09-28

## 2023-10-24 NOTE — PROGRESS NOTES
"Chief Complaint  Deep Vein Thrombosis (Follow up no complains ) and Urinary Frequency (Would like to ask about flomax and  mybetric what dr powers thinks )    Subjective        Herb Jack presents to [facility]  History of Present Illness  Pleasant 68-year-old male here to follow-up hyperlipidemia, chronic anticoagulation and urinary frequency.    Chronic anticoagulation due to DVT on chronic anticoagulation, asymptomatic now and urinary frequency, currently on tamsulosin without benefit.    PSA is normal.  He has a a urology apt tomorrow and was taking myrbetric and he was not taking it if he is working at home.  He just takes it when he is traveling or out for a longer weekend.     Hyperlipidemia not well controlled, slight worsening in LDL.  I do think increasing the protein intake which again is helpful.  A lot of this is familial.  He is a very statin hesitant, had some side effects with taking 1 in the past, he has had some friends who work in cardiac floors and have advised him against taking a statin.    Objective   Vital Signs:  /74   Pulse 60   Temp 97.6 °F (36.4 °C)   Ht 182.9 cm (72\")   Wt 111 kg (245 lb)   SpO2 99%   BMI 33.23 kg/m²   Estimated body mass index is 33.23 kg/m² as calculated from the following:    Height as of this encounter: 182.9 cm (72\").    Weight as of this encounter: 111 kg (245 lb).       BMI is >= 30 and <35. (Class 1 Obesity). The following options were offered after discussion;: exercise counseling/recommendations and nutrition counseling/recommendations      Physical Exam  Vitals and nursing note reviewed.   Constitutional:       General: He is not in acute distress.     Appearance: He is well-developed.   HENT:      Head: Normocephalic.      Nose: Nose normal.   Cardiovascular:      Rate and Rhythm: Normal rate and regular rhythm.      Heart sounds: Normal heart sounds. No murmur heard.  Pulmonary:      Effort: Pulmonary effort " is normal. No respiratory distress.      Breath sounds: Normal breath sounds.   Musculoskeletal:         General: Normal range of motion.   Skin:     General: Skin is warm and dry.      Findings: No rash.   Neurological:      Mental Status: He is alert and oriented to person, place, and time.   Psychiatric:         Behavior: Behavior normal.         Thought Content: Thought content normal.         Judgment: Judgment normal.        Result Review :  The following data was reviewed by: Hannah Gongora MD on 10/24/2023:  CMP          3/8/2023    08:37 10/17/2023    09:29   CMP   Glucose 109  104    BUN 11  11    Creatinine 0.87  1.01    Sodium 146  142    Potassium 4.6  4.2    Chloride 109  105    Calcium 9.9  9.4    Total Protein 6.8  7.0    Albumin 4.2  4.4    Globulin 2.6  2.6    Total Bilirubin 0.6  0.7    Alkaline Phosphatase 57  63    AST (SGOT) 25  24    ALT (SGPT) 22  17    BUN/Creatinine Ratio 12.6  11      CBC          3/8/2023    08:37 10/17/2023    09:29   CBC   WBC 4.85  6.0    RBC 4.73  5.06    Hemoglobin 15.0  16.3    Hematocrit 44.6  47.3    MCV 94.3  94    MCH 31.7  32.2    MCHC 33.6  34.5    RDW 12.1  12.3    Platelets 203  221      CBC w/diff          3/8/2023    08:37 10/17/2023    09:29   CBC w/Diff   WBC 4.85  6.0    RBC 4.73  5.06    Hemoglobin 15.0  16.3    Hematocrit 44.6  47.3    MCV 94.3  94    MCH 31.7  32.2    MCHC 33.6  34.5    RDW 12.1  12.3    Platelets 203  221    Neutrophil Rel % 51.8  56    Lymphocyte Rel % 37.5  33    Monocyte Rel % 7.0  7    Eosinophil Rel % 2.7  3    Basophil Rel % 0.8  1      PSA          10/17/2023    09:29   PSA   PSA 3.2                   Assessment and Plan   Diagnoses and all orders for this visit:    1. Chronic deep vein thrombosis (DVT) of popliteal vein of left lower extremity (Primary)    2. Factor V Leiden mutation    3. Other hyperlipidemia  -     CT Cardiac Calcium Score Without Dye; Future    4. Screening, ischemic heart disease  -     CT Cardiac  Calcium Score Without Dye; Future    5. Need for vaccination  -     Tdap Vaccine Greater Than or Equal To 6yo IM    6. Benign prostatic hyperplasia with urinary frequency    Very pleasant 68-year-old male to follow-up for    Chronic anticoagulation for DVT, continue with Eliquis    Hyperlipidemia, ultimately wanting to prevent cardiovascular disease.  Discussed that Eliquis does not prevent atherosclerosis.  We do know that he has some concerns for peripheral vascular disease with rectal dysfunction.  He has had a stress test.  I do recommend that we consider rosuvastatin 5 mg but he is uncertain, he had adverse effects with 1 statin before, unknown.  We discussed various options, calcium score elected as above.  He knows the limitations of this test and that it cannot detect some of the more naïve or early lesions that can be the most risky for MI.    Continue follow-up with Dr. Villarreal [facility].  PSA reassuring.  I do think taking the Myrbetriq as needed is just fine and the Cialis is daily.  Of course I will support what his specialist says especially as they may have knowledge that I do not if they would advise differently.       Follow Up   Return in about 6 months (around 4/24/2024), or if symptoms worsen or fail to improve, for Annual Physical with fasting labs prior.  Patient was given instructions and counseling regarding his condition or for health maintenance advice. Please see specific information pulled into the AVS if appropriate.     Hannah Gongora MD

## 2024-01-04 ENCOUNTER — HOSPITAL ENCOUNTER (OUTPATIENT)
Dept: CT IMAGING | Facility: HOSPITAL | Age: 69
Discharge: HOME | End: 2024-01-04
Admitting: FAMILY MEDICINE
Payer: COMMERCIAL

## 2024-01-04 DIAGNOSIS — E78.49 OTHER HYPERLIPIDEMIA: ICD-10-CM

## 2024-01-04 DIAGNOSIS — Z13.6 SCREENING, ISCHEMIC HEART DISEASE: ICD-10-CM

## 2024-01-04 PROCEDURE — 75571 CT HRT W/O DYE W/CA TEST: CPT

## 2024-02-27 ENCOUNTER — PATIENT MESSAGE (OUTPATIENT)
Dept: FAMILY MEDICINE CLINIC | Facility: CLINIC | Age: 69
End: 2024-02-27
Payer: COMMERCIAL

## 2024-02-27 DIAGNOSIS — E78.49 OTHER HYPERLIPIDEMIA: Primary | ICD-10-CM

## 2024-02-27 RX ORDER — ROSUVASTATIN CALCIUM 5 MG/1
5 TABLET, COATED ORAL DAILY
Qty: 90 TABLET | Refills: 1 | Status: SHIPPED | OUTPATIENT
Start: 2024-02-27

## 2024-02-27 NOTE — TELEPHONE ENCOUNTER
From: Herb Jack  To: Hannah Gongora  Sent: 2/27/2024 1:34 PM EST  Subject: CT Scan    Hello Dr. Gongora. My results for the CT scan were moderate (136) Do you think I should go ahead with the statin now. (Crestor)?  I am available on my cell or text if needed [phone] Thanks. Herb Jack

## 2024-06-25 ENCOUNTER — OFFICE VISIT (OUTPATIENT)
Dept: FAMILY MEDICINE CLINIC | Facility: CLINIC | Age: 69
End: 2024-06-25
Payer: COMMERCIAL

## 2024-06-25 VITALS
OXYGEN SATURATION: 98 % | SYSTOLIC BLOOD PRESSURE: 135 MMHG | TEMPERATURE: 97.8 F | HEIGHT: 72 IN | HEART RATE: 60 BPM | DIASTOLIC BLOOD PRESSURE: 78 MMHG | WEIGHT: 240 LBS | BODY MASS INDEX: 32.51 KG/M2

## 2024-06-25 DIAGNOSIS — M54.50 ACUTE RIGHT-SIDED LOW BACK PAIN WITHOUT SCIATICA: ICD-10-CM

## 2024-06-25 DIAGNOSIS — I82.532 CHRONIC DEEP VEIN THROMBOSIS (DVT) OF POPLITEAL VEIN OF LEFT LOWER EXTREMITY: ICD-10-CM

## 2024-06-25 DIAGNOSIS — Z00.00 HEALTH MAINTENANCE EXAMINATION: Primary | ICD-10-CM

## 2024-06-25 DIAGNOSIS — Z12.11 SCREEN FOR COLON CANCER: ICD-10-CM

## 2024-06-25 DIAGNOSIS — D68.51 FACTOR V LEIDEN MUTATION: ICD-10-CM

## 2024-06-25 DIAGNOSIS — E78.49 OTHER HYPERLIPIDEMIA: ICD-10-CM

## 2024-06-25 DIAGNOSIS — R73.9 HYPERGLYCEMIA: ICD-10-CM

## 2024-06-25 PROCEDURE — 99397 PER PM REEVAL EST PAT 65+ YR: CPT | Performed by: FAMILY MEDICINE

## 2024-06-25 PROCEDURE — 99214 OFFICE O/P EST MOD 30 MIN: CPT | Performed by: FAMILY MEDICINE

## 2024-06-25 NOTE — PROGRESS NOTES
"Chief Complaint  Annual Exam (Having issues with lower back stabbing pain sometimes when moves back around  ,specific moves triggers the pain  ,had labs done prior )    Subjective        Herb Jack presents to [facility]  History of Present Illness  Annual Exam.    Health Maintenance   Topic Date Due    RSV Vaccine - Adults (1 - 1-dose 60+ series) Never done    COVID-19 Vaccine (6 - 2023-24 season) 02/21/2024    ANNUAL PHYSICAL  03/17/2024    INFLUENZA VACCINE  08/01/2024    BMI FOLLOWUP  10/24/2024    LIPID PANEL  06/20/2025    TDAP/TD VACCINES (4 - Td or Tdap) 10/24/2033    Pneumococcal Vaccine 65+  Completed    HEPATITIS C SCREENING  Addressed    ZOSTER VACCINE  Addressed    COLORECTAL CANCER SCREENING  Discontinued       Diet: eating healthy, mild to moderate alcohol, needs more water - staying away from sugary drinks  Exercise: so busy, not   Immunizations: discussed  Colon cancer screening: due   Sleep/mental health: doing well   Dentist: UTD, getting new dental implants.   Vision: UTD - stable for a long time and may need some closer follow up soon.   Derm: UTD with Derm - no issues     Sharp lower back pain that has been present for 1-2 weeks, if he is twisting or leaning it is a sharp pain - not with walking, sitting or laying, R>L lower back.  He has not tried Tylenol yet would like to do minimal treatments at first, going to [facility] in a couple weeks    Objective   Vital Signs:  /78   Pulse 60   Temp 97.8 °F (36.6 °C)   Ht 182.9 cm (72\")   Wt 109 kg (240 lb)   SpO2 98%   BMI 32.55 kg/m²   Estimated body mass index is 32.55 kg/m² as calculated from the following:    Height as of this encounter: 182.9 cm (72\").    Weight as of this encounter: 109 kg (240 lb).               Physical Exam  Vitals and nursing note reviewed.   Constitutional:       General: He is not in acute distress.     Appearance: Normal appearance. He is well-developed.   HENT:      Head: " Normocephalic.      Right Ear: Tympanic membrane and ear canal normal.      Left Ear: Tympanic membrane and ear canal normal.      Nose: Nose normal.   Cardiovascular:      Rate and Rhythm: Normal rate and regular rhythm.      Heart sounds: Normal heart sounds. No murmur heard.  Pulmonary:      Effort: Pulmonary effort is normal. No respiratory distress.      Breath sounds: Normal breath sounds.   Abdominal:      General: Abdomen is flat. There is no distension.      Palpations: Abdomen is soft.      Tenderness: There is no abdominal tenderness.   Musculoskeletal:         General: Normal range of motion.   Skin:     General: Skin is warm and dry.      Findings: No rash.   Neurological:      Mental Status: He is alert and oriented to person, place, and time.   Psychiatric:         Mood and Affect: Mood normal.         Behavior: Behavior normal.         Thought Content: Thought content normal.         Judgment: Judgment normal.        Result Review :    The following data was reviewed by: Hannah Gongora MD on 06/25/2024:      Lipid Panel (06/20/2024 11:18)  CBC & Differential (06/20/2024 11:18)  Comprehensive Metabolic Panel (06/20/2024 11:18)           Assessment and Plan     Diagnoses and all orders for this visit:    1. Health maintenance examination (Primary)    2. Acute right-sided low back pain without sciatica  -     Ibuprofen 3 %, Gabapentin 10 %, Baclofen 2 %, lidocaine 4 %, Ketamine HCl 4 %; Apply 1-2 g topically to the appropriate area as directed 3 (Three) to 4 (Four) times daily.  Dispense: 90 g; Refill: 5    3. Screen for colon cancer  -     Ambulatory Referral For Screening Colonoscopy    4. Other hyperlipidemia  -     Comprehensive Metabolic Panel; Future  -     CBC & Differential; Future  -     Lipid Panel; Future    5. Hyperglycemia  -     Hemoglobin A1c; Future    6. Factor V Leiden mutation  -     apixaban (Eliquis) 5 MG tablet tablet; Take 1 tablet by mouth Every 12 (Twelve) Hours.  Dispense: 180  tablet; Refill: 3    7. Chronic deep vein thrombosis (DVT) of popliteal vein of left lower extremity  -     apixaban (Eliquis) 5 MG tablet tablet; Take 1 tablet by mouth Every 12 (Twelve) Hours.  Dispense: 180 tablet; Refill: 3    Here for annual exam, fasting labs reviewed with patient, immunizations up-to-date, colon cancer screening up-to-date, cardiovascular screening negative for symptoms, counseled patient to increase vegetable intake, water and 150 minutes of exercise weekly combining weightbearing exercises and aerobic activity.    Lower back pain acute problem for the past 2 weeks not well-controlled, no red flags, topical NSAID as he is anticoagulated with Eliquis for factor V Leiden and history of DVT, no NSAIDs, okay to use Tylenol and heat as needed.  Follow-up if not improving he may need physical therapy okay to call for this.    In terms of cholesterol blood sugar, discussed exercise and increasing water especially increase fiber/veggies although he does pretty well with this already.       Follow Up     Return in about 6 months (around 12/25/2024), or if symptoms worsen or fail to improve, for Recheck HTN with labs prior .  Patient was given instructions and counseling regarding his condition or for health maintenance advice. Please see specific information pulled into the AVS if appropriate.

## 2024-08-15 ENCOUNTER — PREP FOR SURGERY (OUTPATIENT)
Dept: SURGERY | Facility: SURGERY CENTER | Age: 69
End: 2024-08-15
Payer: COMMERCIAL

## 2024-08-15 DIAGNOSIS — Z12.11 ENCOUNTER FOR SCREENING FOR MALIGNANT NEOPLASM OF COLON: Primary | ICD-10-CM

## 2024-08-15 RX ORDER — SODIUM CHLORIDE 0.9 % (FLUSH) 0.9 %
10 SYRINGE (ML) INJECTION AS NEEDED
OUTPATIENT
Start: 2024-08-15

## 2024-08-15 RX ORDER — SODIUM CHLORIDE, SODIUM LACTATE, POTASSIUM CHLORIDE, CALCIUM CHLORIDE 600; 310; 30; 20 MG/100ML; MG/100ML; MG/100ML; MG/100ML
30 INJECTION, SOLUTION INTRAVENOUS CONTINUOUS PRN
OUTPATIENT
Start: 2024-08-15

## 2024-08-15 RX ORDER — SODIUM CHLORIDE 0.9 % (FLUSH) 0.9 %
3 SYRINGE (ML) INJECTION EVERY 12 HOURS SCHEDULED
OUTPATIENT
Start: 2024-08-15

## 2024-08-16 PROBLEM — Z12.11 ENCOUNTER FOR SCREENING FOR MALIGNANT NEOPLASM OF COLON: Status: ACTIVE | Noted: 2024-08-15

## 2024-08-22 DIAGNOSIS — E78.49 OTHER HYPERLIPIDEMIA: ICD-10-CM

## 2024-08-22 RX ORDER — ROSUVASTATIN CALCIUM 5 MG/1
5 TABLET, COATED ORAL DAILY
Qty: 90 TABLET | Refills: 1 | Status: SHIPPED | OUTPATIENT
Start: 2024-08-22

## 2024-08-26 DIAGNOSIS — I82.532 CHRONIC DEEP VEIN THROMBOSIS (DVT) OF POPLITEAL VEIN OF LEFT LOWER EXTREMITY: ICD-10-CM

## 2024-08-26 DIAGNOSIS — D68.51 FACTOR V LEIDEN MUTATION: ICD-10-CM

## 2024-08-26 RX ORDER — APIXABAN 5 MG/1
5 TABLET, FILM COATED ORAL EVERY 12 HOURS
Qty: 180 TABLET | Refills: 3 | Status: SHIPPED | OUTPATIENT
Start: 2024-08-26

## 2024-10-22 ENCOUNTER — TELEPHONE (OUTPATIENT)
Dept: GASTROENTEROLOGY | Facility: CLINIC | Age: 69
End: 2024-10-22

## 2024-10-22 NOTE — TELEPHONE ENCOUNTER
Hub staff attempted to follow warm transfer process and was unsuccessful     Caller: Herb Jack    Relationship to patient: Self    Best call back number: [phone]    Patient is needing: PT CALLED TO ACCEPT  THE ARRIVAL 10/23 AT 10AM.

## 2024-10-23 ENCOUNTER — ANESTHESIA (OUTPATIENT)
Dept: SURGERY | Facility: SURGERY CENTER | Age: 69
End: 2024-10-23
Payer: COMMERCIAL

## 2024-10-23 ENCOUNTER — ANESTHESIA EVENT (OUTPATIENT)
Dept: SURGERY | Facility: SURGERY CENTER | Age: 69
End: 2024-10-23
Payer: COMMERCIAL

## 2024-10-23 ENCOUNTER — HOSPITAL ENCOUNTER (OUTPATIENT)
Facility: SURGERY CENTER | Age: 69
Setting detail: HOSPITAL OUTPATIENT SURGERY
Discharge: HOME | End: 2024-10-23
Attending: INTERNAL MEDICINE | Admitting: INTERNAL MEDICINE
Payer: COMMERCIAL

## 2024-10-23 VITALS
HEIGHT: 72 IN | OXYGEN SATURATION: 93 % | DIASTOLIC BLOOD PRESSURE: 80 MMHG | RESPIRATION RATE: 16 BRPM | SYSTOLIC BLOOD PRESSURE: 121 MMHG | HEART RATE: 60 BPM | WEIGHT: 239.6 LBS | BODY MASS INDEX: 32.45 KG/M2 | TEMPERATURE: 97.5 F

## 2024-10-23 DIAGNOSIS — Z12.11 ENCOUNTER FOR SCREENING FOR MALIGNANT NEOPLASM OF COLON: ICD-10-CM

## 2024-10-23 PROCEDURE — 25010000002 PROPOFOL 10 MG/ML EMULSION: Performed by: NURSE ANESTHETIST, CERTIFIED REGISTERED

## 2024-10-23 PROCEDURE — 25010000002 PROPOFOL 1000 MG/100ML EMULSION: Performed by: NURSE ANESTHETIST, CERTIFIED REGISTERED

## 2024-10-23 PROCEDURE — 45378 DIAGNOSTIC COLONOSCOPY: CPT | Performed by: INTERNAL MEDICINE

## 2024-10-23 PROCEDURE — 25010000002 LIDOCAINE PF 2% 2 % SOLUTION: Performed by: NURSE ANESTHETIST, CERTIFIED REGISTERED

## 2024-10-23 PROCEDURE — 25810000003 LACTATED RINGERS PER 1000 ML: Performed by: INTERNAL MEDICINE

## 2024-10-23 RX ORDER — SODIUM CHLORIDE, SODIUM LACTATE, POTASSIUM CHLORIDE, CALCIUM CHLORIDE 600; 310; 30; 20 MG/100ML; MG/100ML; MG/100ML; MG/100ML
30 INJECTION, SOLUTION INTRAVENOUS CONTINUOUS PRN
Status: DISCONTINUED | OUTPATIENT
Start: 2024-10-23 | End: 2024-10-23 | Stop reason: HOSPADM

## 2024-10-23 RX ORDER — LIDOCAINE HYDROCHLORIDE 20 MG/ML
INJECTION, SOLUTION EPIDURAL; INFILTRATION; INTRACAUDAL; PERINEURAL AS NEEDED
Status: DISCONTINUED | OUTPATIENT
Start: 2024-10-23 | End: 2024-10-23 | Stop reason: SURG

## 2024-10-23 RX ORDER — SODIUM CHLORIDE 0.9 % (FLUSH) 0.9 %
3 SYRINGE (ML) INJECTION EVERY 12 HOURS SCHEDULED
Status: DISCONTINUED | OUTPATIENT
Start: 2024-10-23 | End: 2024-10-23 | Stop reason: HOSPADM

## 2024-10-23 RX ORDER — SODIUM CHLORIDE 0.9 % (FLUSH) 0.9 %
10 SYRINGE (ML) INJECTION AS NEEDED
Status: DISCONTINUED | OUTPATIENT
Start: 2024-10-23 | End: 2024-10-23 | Stop reason: HOSPADM

## 2024-10-23 RX ORDER — PROPOFOL 10 MG/ML
INJECTION, EMULSION INTRAVENOUS AS NEEDED
Status: DISCONTINUED | OUTPATIENT
Start: 2024-10-23 | End: 2024-10-23 | Stop reason: SURG

## 2024-10-23 RX ADMIN — LIDOCAINE HYDROCHLORIDE 60 MG: 20 INJECTION, SOLUTION EPIDURAL; INFILTRATION; INTRACAUDAL; PERINEURAL at 12:08

## 2024-10-23 RX ADMIN — PROPOFOL 150 MG: 10 INJECTION, EMULSION INTRAVENOUS at 12:08

## 2024-10-23 RX ADMIN — PROPOFOL 200 MCG/KG/MIN: 10 INJECTION, EMULSION INTRAVENOUS at 12:08

## 2024-10-23 RX ADMIN — SODIUM CHLORIDE, SODIUM LACTATE, POTASSIUM CHLORIDE, AND CALCIUM CHLORIDE 30 ML/HR: .6; .31; .03; .02 INJECTION, SOLUTION INTRAVENOUS at 11:17

## 2024-10-23 NOTE — ANESTHESIA POSTPROCEDURE EVALUATION
"Patient: Herb Jack    Procedure Summary       Date: 10/23/24 Room / Location: [facility]    Anesthesia Start: 1205 Anesthesia Stop: 1225    Procedure: COLONOSCOPY FOR SCREENING Diagnosis:       Encounter for screening for malignant neoplasm of colon      (Encounter for screening for malignant neoplasm of colon [Z12.11])    Surgeons: Eleno Zhu MD Provider: Ronald Dorman DO    Anesthesia Type: MAC ASA Status: 3            Anesthesia Type: MAC    Vitals  Vitals Value Taken Time   /89 10/23/24 1255   Temp     Pulse 62 10/23/24 1241   Resp 16 10/23/24 1245   SpO2 93 % 10/23/24 1240   Vitals shown include unfiled device data.        Post Anesthesia Care and Evaluation    Patient location during evaluation: bedside  Patient participation: complete - patient participated  Level of consciousness: awake and alert  Pain management: adequate    Airway patency: patent  Anesthetic complications: No anesthetic complications  PONV Status: controlled  Cardiovascular status: acceptable and hemodynamically stable  Respiratory status: acceptable, spontaneous ventilation and nonlabored ventilation  Hydration status: acceptable    Comments: /80   Pulse 60   Temp 36.4 °C (97.5 °F) (Temporal)   Resp 16   Ht 182.9 cm (72\")   Wt 109 kg (239 lb 9.6 oz)   SpO2 95%   BMI 32.50 kg/m²       "

## 2024-10-23 NOTE — ANESTHESIA PREPROCEDURE EVALUATION
Anesthesia Evaluation     Patient summary reviewed   no history of anesthetic complications:   NPO Solid Status: > 8 hours  NPO Liquid Status: > 2 hours           Airway   Mallampati: II  TM distance: >3 FB  Neck ROM: full  No difficulty expected  Dental          Pulmonary     breath sounds clear to auscultation  (-) shortness of breath, recent URI, not a smokerSleep apnea: STOP BANG 3.  Cardiovascular   Exercise tolerance: good (4-7 METS)    PT is on anticoagulation therapy  Rhythm: regular  Rate: normal    (+) DVT (FactorV Leiden hetero) resolved, hyperlipidemia  (-) past MI, dysrhythmias, angina      Neuro/Psych  (-) seizures, CVA  GI/Hepatic/Renal/Endo    (+) obesityRenal disease: Cr 0.90 (6/2024).    ROS Comment: Screening colo    Musculoskeletal     (-) neck stiffness  Abdominal    Substance History      OB/GYN          Other        ROS/Med Hx Other: Last Eliquis 10/20                Anesthesia Plan    ASA 3     MAC     (MAC anesthesia discussed with patient and/or patient representative. Risks (including but not limited to intra-op awareness), benefits, and alternatives were discussed. Understanding was voiced with an agreement to proceed with a MAC technique and General as a backup option. )    Anesthetic plan, risks, benefits, and alternatives have been provided, discussed and informed consent has been obtained with: patient.      CODE STATUS:

## 2024-10-23 NOTE — H&P
No chief complaint on file.      HPI  screening         Problem List:    Patient Active Problem List   Diagnosis    Deep vein thrombosis of lower extremity    Factor V Leiden mutation    Left shoulder pain    Knee pain, left    Other male erectile dysfunction    Other hyperlipidemia    Benign prostatic hyperplasia with urinary frequency    Encounter for screening for malignant neoplasm of colon       Medical History:    Past Medical History:   Diagnosis Date    Anticoagulated     WARFARIN STOPPED 12-8-16    DVT (deep venous thrombosis)     LEFT LEG 2 YRS AGO AFTER TRAVELING    Factor 5 Leiden mutation, heterozygous     Factor 5 Leiden mutation, heterozygous         Social History:    Social History     Socioeconomic History    Marital status:    Tobacco Use    Smoking status: Never    Smokeless tobacco: Never   Vaping Use    Vaping status: Never Used   Substance and Sexual Activity    Alcohol use: Yes     Comment: 1/wk    Drug use: No    Sexual activity: Yes     Partners: Female     Comment: Wife        Family History:   Family History   Problem Relation Age of Onset    Hyperlipidemia Father     Bipolar disorder Sister     Bipolar disorder Brother         suicide     Factor V Leiden deficiency Son     Colon cancer Neg Hx     Breast cancer Neg Hx     Heart attack Neg Hx     Stroke Neg Hx     Thyroid cancer Neg Hx     Diabetes Neg Hx     Prostate cancer Neg Hx        Surgical History:   Past Surgical History:   Procedure Laterality Date    COLONOSCOPY      KNEE ARTHROSCOPY Left 12/13/2016    Procedure: KNEE ARTHROSCOPY, PARTIAL MEDIAL AND LATERAL MENISECTOMY AND PLICA EXCISION;  Surgeon: Elijah Monet MD;  [place] [facility] [place] [facility] [place]  Service:     SHOULDER SURGERY  1977       No current facility-administered medications for this encounter.    Current Outpatient Medications:     Ibuprofen 3 %, Gabapentin 10 %, Baclofen 2 %, lidocaine 4 %, Ketamine HCl 4 %, Apply 1-2 g topically to the appropriate area as  directed 3 (Three) to 4 (Four) times daily., Disp: 90 g, Rfl: 5    Mirabegron ER (MYRBETRIQ) 25 MG tablet sustained-release 24 hour 24 hr tablet, Take 1 tablet by mouth Daily., Disp: , Rfl:     Multiple Vitamins-Minerals (MULTI COMPLETE PO), Take 1 capsule by mouth Every Morning., Disp: , Rfl:     omeprazole (priLOSEC) 40 MG capsule, Take 1 capsule by mouth Daily., Disp: 30 capsule, Rfl: 5    rosuvastatin (CRESTOR) 5 MG tablet, TAKE 1 TABLET BY MOUTH DAILY, Disp: 90 tablet, Rfl: 1    tadalafil (CIALIS) 5 MG tablet, Take 1 tablet by mouth Daily As Needed for Erectile Dysfunction., Disp: , Rfl:     Eliquis 5 MG tablet tablet, TAKE ONE TABLET BY MOUTH EVERY 12 HOURS, Disp: 180 tablet, Rfl: 3    Allergies: No Known Allergies     The following portions of the patient's history were reviewed by me and updated as appropriate: review of systems, allergies, current medications, past family history, past medical history, past social history, past surgical history and problem list.    There were no vitals filed for this visit.    PHYSICAL EXAM:    CONSTITUTIONAL:  today's vital signs reviewed by me  GASTROINTESTINAL: abdomen is soft nontender nondistended with normal active bowel sounds, no masses are appreciated    Assessment/ Plan  Screening    colonoscopy    Risks and benefits as well as alternatives to endoscopic evaluation were explained to the patient and they voiced understanding and wish to proceed.  These risks include but are not limited to the risk of bleeding, perforation, adverse reaction to sedation, and missed lesions.  The patient was given the opportunity to ask questions prior to the endoscopic procedure.

## 2024-10-24 ENCOUNTER — TELEPHONE (OUTPATIENT)
Dept: FAMILY MEDICINE CLINIC | Facility: CLINIC | Age: 69
End: 2024-10-24
Payer: COMMERCIAL

## 2024-10-24 NOTE — TELEPHONE ENCOUNTER
Caller: Herb Jack    Relationship: Self    Best call back number: [phone]    Who are you requesting to speak with (clinical staff, provider,  specific staff member): CLINICAL STAFF    What was the call regarding: STATED THAT THEY WOULD LIKE TO KNOW IF THEY CAN HAVE THE LABS THAT THEY ARE NEEDING TO GET THROUGH [facility] DONE AT THE SAME TIME THEY COME IN FOR THEIR LABS WITH DR. MACEDO. STATED THAT THEY WANT TO AVOID DUPLICATE LABS AND THEY THINK THE ONLY THING DIFFERENT THEY HAVE OVER THERE IS A PSA. STATED THAT THEY WOULD PREFER TO COME THROUGH [facility] TO GET THESE DONE IF THEY CAN. PLEASE CALL AND ADVISE

## 2024-10-25 DIAGNOSIS — Z13.6 SCREENING, ISCHEMIC HEART DISEASE: ICD-10-CM

## 2024-10-25 DIAGNOSIS — N40.1 BENIGN PROSTATIC HYPERPLASIA WITH URINARY FREQUENCY: ICD-10-CM

## 2024-10-25 DIAGNOSIS — Z12.5 PROSTATE CANCER SCREENING: ICD-10-CM

## 2024-10-25 DIAGNOSIS — D68.51 FACTOR V LEIDEN MUTATION: Primary | ICD-10-CM

## 2024-10-25 DIAGNOSIS — Z00.00 HEALTH MAINTENANCE EXAMINATION: ICD-10-CM

## 2024-10-25 DIAGNOSIS — E78.49 OTHER HYPERLIPIDEMIA: ICD-10-CM

## 2024-10-25 DIAGNOSIS — I82.532 CHRONIC DEEP VEIN THROMBOSIS (DVT) OF POPLITEAL VEIN OF LEFT LOWER EXTREMITY: ICD-10-CM

## 2024-10-25 DIAGNOSIS — R73.9 HYPERGLYCEMIA: ICD-10-CM

## 2024-10-25 DIAGNOSIS — R35.0 BENIGN PROSTATIC HYPERPLASIA WITH URINARY FREQUENCY: ICD-10-CM

## 2024-10-25 NOTE — TELEPHONE ENCOUNTER
Yes I am happy to include a PSA, the last time I ordered 1 was October 2023, has he had 1 done more recently?  Will he need this added to his labs in December?      Vera, as it will be a separate order will probably have to order all of his labs again just to make sure nothing is missed

## 2024-12-04 LAB
ALBUMIN SERPL-MCNC: 4.5 G/DL (ref 3.9–4.9)
ALP SERPL-CCNC: 65 IU/L (ref 44–121)
ALT SERPL-CCNC: 26 IU/L (ref 0–44)
AST SERPL-CCNC: 29 IU/L (ref 0–40)
BASOPHILS # BLD AUTO: 0.1 X10E3/UL (ref 0–0.2)
BASOPHILS NFR BLD AUTO: 1 %
BILIRUB SERPL-MCNC: 0.8 MG/DL (ref 0–1.2)
BUN SERPL-MCNC: 12 MG/DL (ref 8–27)
BUN/CREAT SERPL: 12 (ref 10–24)
CALCIUM SERPL-MCNC: 9.6 MG/DL (ref 8.6–10.2)
CHLORIDE SERPL-SCNC: 104 MMOL/L (ref 96–106)
CHOLEST SERPL-MCNC: 177 MG/DL (ref 100–199)
CO2 SERPL-SCNC: 24 MMOL/L (ref 20–29)
CREAT SERPL-MCNC: 0.97 MG/DL (ref 0.76–1.27)
EGFRCR SERPLBLD CKD-EPI 2021: 85 ML/MIN/1.73
EOSINOPHIL # BLD AUTO: 0.2 X10E3/UL (ref 0–0.4)
EOSINOPHIL NFR BLD AUTO: 3 %
ERYTHROCYTE [DISTWIDTH] IN BLOOD BY AUTOMATED COUNT: 12.6 % (ref 11.6–15.4)
GLOBULIN SER CALC-MCNC: 2.9 G/DL (ref 1.5–4.5)
GLUCOSE SERPL-MCNC: 106 MG/DL (ref 70–99)
HBA1C MFR BLD: 5.8 % (ref 4.8–5.6)
HCT VFR BLD AUTO: 48.3 % (ref 37.5–51)
HDLC SERPL-MCNC: 59 MG/DL
HGB BLD-MCNC: 16.2 G/DL (ref 13–17.7)
IMM GRANULOCYTES # BLD AUTO: 0 X10E3/UL (ref 0–0.1)
IMM GRANULOCYTES NFR BLD AUTO: 0 %
LDLC SERPL CALC-MCNC: 97 MG/DL (ref 0–99)
LDLC/HDLC SERPL: 1.6 RATIO (ref 0–3.6)
LYMPHOCYTES # BLD AUTO: 2.1 X10E3/UL (ref 0.7–3.1)
LYMPHOCYTES NFR BLD AUTO: 29 %
MCH RBC QN AUTO: 31.8 PG (ref 26.6–33)
MCHC RBC AUTO-ENTMCNC: 33.5 G/DL (ref 31.5–35.7)
MCV RBC AUTO: 95 FL (ref 79–97)
MONOCYTES # BLD AUTO: 0.6 X10E3/UL (ref 0.1–0.9)
MONOCYTES NFR BLD AUTO: 8 %
NEUTROPHILS # BLD AUTO: 4.3 X10E3/UL (ref 1.4–7)
NEUTROPHILS NFR BLD AUTO: 59 %
PLATELET # BLD AUTO: 237 X10E3/UL (ref 150–450)
POTASSIUM SERPL-SCNC: 4.4 MMOL/L (ref 3.5–5.2)
PROT SERPL-MCNC: 7.4 G/DL (ref 6–8.5)
PSA SERPL-MCNC: 3.3 NG/ML (ref 0–4)
RBC # BLD AUTO: 5.09 X10E6/UL (ref 4.14–5.8)
SODIUM SERPL-SCNC: 142 MMOL/L (ref 134–144)
TRIGL SERPL-MCNC: 121 MG/DL (ref 0–149)
VLDLC SERPL CALC-MCNC: 21 MG/DL (ref 5–40)
WBC # BLD AUTO: 7.3 X10E3/UL (ref 3.4–10.8)

## 2024-12-16 ENCOUNTER — OFFICE VISIT (OUTPATIENT)
Dept: FAMILY MEDICINE CLINIC | Facility: CLINIC | Age: 69
End: 2024-12-16
Payer: COMMERCIAL

## 2024-12-16 VITALS
HEIGHT: 72 IN | WEIGHT: 243 LBS | SYSTOLIC BLOOD PRESSURE: 124 MMHG | BODY MASS INDEX: 32.91 KG/M2 | OXYGEN SATURATION: 99 % | HEART RATE: 50 BPM | DIASTOLIC BLOOD PRESSURE: 82 MMHG | TEMPERATURE: 97.6 F

## 2024-12-16 DIAGNOSIS — R73.9 HYPERGLYCEMIA: ICD-10-CM

## 2024-12-16 DIAGNOSIS — D68.51 FACTOR V LEIDEN MUTATION: ICD-10-CM

## 2024-12-16 DIAGNOSIS — E78.49 OTHER HYPERLIPIDEMIA: Primary | ICD-10-CM

## 2024-12-16 PROCEDURE — 99214 OFFICE O/P EST MOD 30 MIN: CPT | Performed by: FAMILY MEDICINE

## 2024-12-16 NOTE — PROGRESS NOTES
"Chief Complaint  Hyperlipidemia (Doing well ,no complains    no chest pain  or palpitations had labs prior apt ) hyperglycemia, factor V    Subjective        Herb Jack presents to [facility]  Hypertension      Pleasant 69-year-old male here to follow-up for hyperlipidemia which is well-controlled on rosuvastatin 5 mg daily, no adverse effects and hyperglycemia which is mildly elevated, not on medication currently, no symptoms.    He is on Eliquis for factor V Leiden mutation with history of DVT.  No bleeding or complications.     Objective   Vital Signs:  /82   Pulse 50   Temp 97.6 °F (36.4 °C)   Ht 182.9 cm (72\")   Wt 110 kg (243 lb)   SpO2 99%   BMI 32.96 kg/m²   Estimated body mass index is 32.96 kg/m² as calculated from the following:    Height as of this encounter: 182.9 cm (72\").    Weight as of this encounter: 110 kg (243 lb).    BMI is >= 30 and <35. (Class 1 Obesity). The following options were offered after discussion;: exercise counseling/recommendations and nutrition counseling/recommendations      Physical Exam  Vitals and nursing note reviewed.   Constitutional:       General: He is not in acute distress.     Appearance: He is well-developed.   HENT:      Head: Normocephalic.      Nose: Nose normal.   Cardiovascular:      Rate and Rhythm: Normal rate and regular rhythm.      Heart sounds: Normal heart sounds. No murmur heard.  Pulmonary:      Effort: Pulmonary effort is normal. No respiratory distress.      Breath sounds: Normal breath sounds.   Musculoskeletal:         General: Normal range of motion.   Skin:     General: Skin is warm and dry.      Findings: No rash.   Neurological:      Mental Status: He is alert and oriented to person, place, and time.   Psychiatric:         Behavior: Behavior normal.         Thought Content: Thought content normal.         Judgment: Judgment normal.        Result Review :  The following data was reviewed by: Hannah BAUM" MD Rolan on 12/16/2024:         PSA Screen (12/03/2024 11:36)  Hemoglobin A1c (12/03/2024 11:36)  Lipid Panel With LDL / HDL Ratio (12/03/2024 11:36)  Comprehensive Metabolic Panel (12/03/2024 11:36)  CBC & Differential (12/03/2024 11:36)     Assessment and Plan   Diagnoses and all orders for this visit:    1. Other hyperlipidemia (Primary)  -     Comprehensive Metabolic Panel; Future  -     CBC & Differential; Future  -     Lipid Panel; Future    2. Hyperglycemia  -     Hemoglobin A1c; Future    3. Factor V Leiden mutation      Pleasant 69-year-old male here to follow-up hyperlipidemia which is well-controlled, hyperglycemia that is slightly worsening compared to prior, overall asymptomatic.    Patient with known factor V Leiden mutation with history of DVT of the left extremity, on Eliquis no bleeding or issues.       Follow Up   Return in about 6 months (around 6/16/2025), or if symptoms worsen or fail to improve, for Annual Physical with fasting labs prior.  Patient was given instructions and counseling regarding his condition or for health maintenance advice. Please see specific information pulled into the AVS if appropriate.

## 2025-02-16 DIAGNOSIS — E78.49 OTHER HYPERLIPIDEMIA: ICD-10-CM

## 2025-02-16 RX ORDER — ROSUVASTATIN CALCIUM 5 MG/1
5 TABLET, COATED ORAL DAILY
Qty: 90 TABLET | Refills: 1 | Status: SHIPPED | OUTPATIENT
Start: 2025-02-16

## 2025-06-19 ENCOUNTER — TELEPHONE (OUTPATIENT)
Dept: FAMILY MEDICINE CLINIC | Facility: CLINIC | Age: 70
End: 2025-06-19
Payer: COMMERCIAL

## 2025-06-19 NOTE — TELEPHONE ENCOUNTER
ATTEMPTED TO WARM TRANSFER BUT NO ANSWER    Caller: Herb Jack    Relationship to patient: Self    Best call back number: [phone]    Chief complaint: COUGH, CONGESTION IN HIS CHEST    Type of visit: SAME DAY    Requested date: 6/19/25 OR 6/20/25

## 2025-06-20 ENCOUNTER — OFFICE VISIT (OUTPATIENT)
Dept: FAMILY MEDICINE CLINIC | Facility: CLINIC | Age: 70
End: 2025-06-20
Payer: COMMERCIAL

## 2025-06-20 VITALS
HEIGHT: 72 IN | BODY MASS INDEX: 33.18 KG/M2 | HEART RATE: 67 BPM | SYSTOLIC BLOOD PRESSURE: 132 MMHG | OXYGEN SATURATION: 97 % | TEMPERATURE: 97.8 F | WEIGHT: 245 LBS | DIASTOLIC BLOOD PRESSURE: 80 MMHG

## 2025-06-20 DIAGNOSIS — J40 BRONCHITIS: Primary | ICD-10-CM

## 2025-06-20 PROCEDURE — 99213 OFFICE O/P EST LOW 20 MIN: CPT | Performed by: FAMILY MEDICINE

## 2025-06-20 RX ORDER — METHYLPREDNISOLONE 4 MG/1
TABLET ORAL
Qty: 21 EACH | Refills: 0 | Status: SHIPPED | OUTPATIENT
Start: 2025-06-20

## 2025-06-20 RX ORDER — DEXTROMETHORPHAN HYDROBROMIDE AND PROMETHAZINE HYDROCHLORIDE 15; 6.25 MG/5ML; MG/5ML
5 SYRUP ORAL 4 TIMES DAILY PRN
Qty: 240 ML | Refills: 1 | Status: SHIPPED | OUTPATIENT
Start: 2025-06-20

## 2025-06-20 NOTE — PROGRESS NOTES
"Chief Complaint  Cough (Cough chest tightness and  congestion for over 10 days   feels like  cloudy head and  tiredness  had to travel over a week ago and feel after this trip when starting getting sick )    Subjective        Herb Jack presents to [facility]  Cough      History of Present Illness  The patient presents with a persistent cough for 12-13 days, initially thought to be a common cold. Symptoms progressed to chest congestion, difficulty with deep inhalation, productive cough, shortness of breath, and lightheadedness. DayQuil and NyQuil provide some relief. No history of pneumonia or asthma. Used an inhaler once 10-20 years ago. Unable to ascend hills rapidly as he did last night. No adverse reactions to Augmentin. Uses omeprazole as needed.    Persistent Cough and Related Symptoms  - Onset: Persistent cough for 12-13 days, initially thought to be a common cold.  - Location: Chest congestion.  - Character: Difficulty with deep inhalation, productive cough, shortness of breath, and lightheadedness.  - Alleviating Factors: DayQuil and NyQuil provide some relief.  - Severity: Unable to ascend hills rapidly as he did last night.    MEDICATIONS  Current: Eliquis, omeprazole (as needed), DayQuil, NyQuil       Objective   Vital Signs:  /80   Pulse 67   Temp 97.8 °F (36.6 °C)   Ht 182.9 cm (72\")   Wt 111 kg (245 lb)   SpO2 97%   BMI 33.23 kg/m²   Estimated body mass index is 33.23 kg/m² as calculated from the following:    Height as of this encounter: 182.9 cm (72\").    Weight as of this encounter: 111 kg (245 lb).            Physical Exam  Vitals and nursing note reviewed.   Constitutional:       General: He is not in acute distress.     Appearance: He is well-developed.   HENT:      Head: Normocephalic.      Right Ear: Tympanic membrane, ear canal and external ear normal. There is no impacted cerumen.      Left Ear: Tympanic membrane, ear canal and external ear " normal. There is no impacted cerumen.      Ears:      Comments: no sinus tenderness     Nose: Nose normal.   Cardiovascular:      Rate and Rhythm: Normal rate and regular rhythm.      Heart sounds: Normal heart sounds. No murmur heard.  Pulmonary:      Effort: Pulmonary effort is normal. No respiratory distress.      Breath sounds: Normal breath sounds. No wheezing or rales.   Musculoskeletal:         General: Normal range of motion.   Skin:     General: Skin is warm and dry.      Findings: No rash.   Neurological:      Mental Status: He is alert and oriented to person, place, and time.   Psychiatric:         Behavior: Behavior normal.         Thought Content: Thought content normal.         Judgment: Judgment normal.            Result Review :                  Assessment and Plan   Diagnoses and all orders for this visit:    1. Bronchitis (Primary)  -     amoxicillin-clavulanate (AUGMENTIN) 875-125 MG per tablet; Take 1 tablet by mouth 2 (Two) Times a Day for 7 days.  Dispense: 14 tablet; Refill: 0  -     methylPREDNISolone (MEDROL) 4 MG dose pack; Take as directed on package instructions.  Dispense: 21 each; Refill: 0  -     promethazine-dextromethorphan (PROMETHAZINE-DM) 6.25-15 MG/5ML syrup; Take 5 mL by mouth 4 (Four) Times a Day As Needed for Cough.  Dispense: 240 mL; Refill: 1           Assessment & Plan  1. Bronchitis, acute problem, over 10 days  - Clear lung sounds, no wheezing or crackles, normal BP  - Continue DayQuil  - Prescribed Augmentin 875 mg q12h x7 days and 5-day Medrol Dosepak  - Omeprazole with antibiotics and steroids if GERD symptoms  - Promethazine cough syrup for night  - Cough may persist post-antibiotics  - Consider chest x-ray if symptoms worsen      Follow Up   Return if symptoms worsen or fail to improve.  Patient was given instructions and counseling regarding his condition or for health maintenance advice. Please see specific information pulled into the AVS if appropriate.          Hannah Gongora MD      Patient or patient representative verbalized consent for the use of Ambient Listening during the visit with  Hannah Gongora MD for chart documentation. 6/20/2025  16:49 EDT

## 2025-06-30 ENCOUNTER — OFFICE VISIT (OUTPATIENT)
Dept: FAMILY MEDICINE CLINIC | Facility: CLINIC | Age: 70
End: 2025-06-30
Payer: COMMERCIAL

## 2025-06-30 VITALS
SYSTOLIC BLOOD PRESSURE: 128 MMHG | BODY MASS INDEX: 33.18 KG/M2 | HEIGHT: 72 IN | HEART RATE: 62 BPM | DIASTOLIC BLOOD PRESSURE: 82 MMHG | TEMPERATURE: 98 F | OXYGEN SATURATION: 99 % | WEIGHT: 245 LBS

## 2025-06-30 DIAGNOSIS — R05.2 SUBACUTE COUGH: Primary | ICD-10-CM

## 2025-06-30 PROCEDURE — 99213 OFFICE O/P EST LOW 20 MIN: CPT | Performed by: NURSE PRACTITIONER

## 2025-06-30 RX ORDER — ALBUTEROL SULFATE 90 UG/1
2 INHALANT RESPIRATORY (INHALATION) EVERY 4 HOURS PRN
Qty: 8 G | Refills: 0 | Status: SHIPPED | OUTPATIENT
Start: 2025-06-30

## 2025-06-30 RX ORDER — FLUTICASONE PROPIONATE 110 UG/1
1 AEROSOL, METERED RESPIRATORY (INHALATION) DAILY
Qty: 12 G | Refills: 1 | Status: SHIPPED | OUTPATIENT
Start: 2025-06-30

## 2025-06-30 NOTE — PROGRESS NOTES
"Chief Complaint  Bronchitis (fu 6/20/25 appt w . Ongoing chest congestion and productive cough ~20D minimal improvement after completing steroid and abx )    Subjective          Herb Jack presents to [facility]  History of Present Illness    History of Present Illness  The patient presents for evaluation of a persistent cough.    He was last seen 10 days ago by Dr. Gongora, who prescribed a combination of steroids, Augmentin, and cough medicine. This treatment regimen provided some relief, but the cough and chest congestion have not completely subsided. The onset of these symptoms was around 06/10/2025. His cough is present throughout the day, intensifying at night. He also experiences wheezing and shortness of breath. Initially, his cough was productive during the steroid treatment, but it has since improved slightly, although he still produces sputum. He previously experienced fever, chills, and night sweats, but these symptoms have resolved. Currently, he reports low energy levels. He is not currently taking any over-the-counter medications for his symptoms. He has no history of asthma or COPD.    He also reports ear issues, including hearing loss and a sensation of fullness, which he believes may be related to his current condition. He has a history of allergies.      Objective   Vital Signs:   /82   Pulse 62   Temp 98 °F (36.7 °C)   Ht 182.9 cm (72.01\")   Wt 111 kg (245 lb)   SpO2 99%   BMI 33.22 kg/m²       Physical Exam  Vitals reviewed.   Constitutional:       General: He is not in acute distress.     Appearance: Normal appearance. He is not ill-appearing, toxic-appearing or diaphoretic.   HENT:      Head: Normocephalic and atraumatic.   Pulmonary:      Effort: Pulmonary effort is normal.      Comments: Tight breath sounds on expiration  Neurological:      General: No focal deficit present.      Mental Status: He is alert and oriented to person, " place, and time.      Motor: No weakness.      Gait: Gait normal.          Result Review :                  Results         Assessment and Plan    Diagnoses and all orders for this visit:    1. Subacute cough (Primary)  -     XR Chest 2 View  -     fluticasone (FLOVENT HFA) 110 MCG/ACT inhaler; Inhale 1 puff Daily.  Dispense: 12 g; Refill: 1  -     albuterol sulfate  (90 Base) MCG/ACT inhaler; Inhale 2 puffs Every 4 (Four) Hours As Needed for Shortness of Air or Wheezing.  Dispense: 8 g; Refill: 0        Assessment & Plan  1. Persistent cough.  - Reports persistent cough worsening at night, with chest congestion. Some improvement noted with steroids and antibiotics (Augmentin) prescribed 10 days ago, but symptoms persist.  - Physical exam reveals lungs sound good with slight tightness. No fever, chills, or night sweats currently, but low energy reported.  - Chest x-ray ordered to rule out pneumonia. No history of asthma or COPD.  - Prescribed albuterol inhaler for use as needed, up to 4 times daily. Daily steroidal inhaler, Flovent (fluticasone), prescribed to manage symptoms and prevent bronchospasms. Advised to rinse mouth after use to prevent thrush or fungal infections.    2. Ear fullness.  - Reports sensation of fullness in ears and some hearing loss, likely related to respiratory symptoms.  - Physical exam shows fluid behind both eardrums, common with upper and lower respiratory tract infections.  - Flonase nasal spray recommended.        Follow Up   Return if symptoms worsen or fail to improve.    Patient was given instructions and counseling regarding his condition or for health maintenance advice. Please see specific information pulled into the AVS if appropriate.       Transcribed from ambient dictation for DIONNE Vang by DIONNE Vang.  06/30/25   13:20 EDT    Patient or patient representative verbalized consent for the use of Ambient Listening during the visit with  DIONNE Vang  for chart documentation. 7/1/2025  15:33 EDT  Electronically signed by DIONNE Vang, 07/01/25, 3:33 PM EDT.

## 2025-07-08 ENCOUNTER — TELEPHONE (OUTPATIENT)
Dept: FAMILY MEDICINE CLINIC | Facility: CLINIC | Age: 70
End: 2025-07-08
Payer: COMMERCIAL

## 2025-07-08 NOTE — TELEPHONE ENCOUNTER
Spoke with PT  , advised he takes it as need it if feeling better ok to extent the treatment hours we will keep an eye on his up coming xray ,pt doing better he said

## 2025-07-08 NOTE — TELEPHONE ENCOUNTER
Pt called and wanted to know for the albuterol sulfate & fluticasone if he should take them until they run out or if his chest starts to not feel so tight should he stop taking them? Please advise and contact pt at [phone]. Chest Xray has been scheduled for 08/13

## 2025-07-10 DIAGNOSIS — R05.2 SUBACUTE COUGH: ICD-10-CM

## 2025-07-10 RX ORDER — ALBUTEROL SULFATE 90 UG/1
INHALANT RESPIRATORY (INHALATION)
Qty: 8.5 G | Refills: 2 | Status: SHIPPED | OUTPATIENT
Start: 2025-07-10

## 2025-08-13 ENCOUNTER — TELEPHONE (OUTPATIENT)
Dept: FAMILY MEDICINE CLINIC | Facility: CLINIC | Age: 70
End: 2025-08-13
Payer: COMMERCIAL

## 2025-08-13 DIAGNOSIS — M25.552 LEFT HIP PAIN: Primary | ICD-10-CM

## 2025-08-18 ENCOUNTER — TELEPHONE (OUTPATIENT)
Dept: FAMILY MEDICINE CLINIC | Facility: CLINIC | Age: 70
End: 2025-08-18
Payer: COMMERCIAL

## 2025-08-19 DIAGNOSIS — D68.51 FACTOR V LEIDEN MUTATION: ICD-10-CM

## 2025-08-19 DIAGNOSIS — I82.532 CHRONIC DEEP VEIN THROMBOSIS (DVT) OF POPLITEAL VEIN OF LEFT LOWER EXTREMITY: ICD-10-CM

## 2025-08-19 DIAGNOSIS — E78.49 OTHER HYPERLIPIDEMIA: ICD-10-CM

## 2025-08-19 RX ORDER — ROSUVASTATIN CALCIUM 5 MG/1
5 TABLET, COATED ORAL DAILY
Qty: 90 TABLET | Refills: 1 | Status: SHIPPED | OUTPATIENT
Start: 2025-08-19

## 2025-08-19 RX ORDER — APIXABAN 5 MG/1
5 TABLET, FILM COATED ORAL EVERY 12 HOURS SCHEDULED
Qty: 180 TABLET | Refills: 3 | Status: SHIPPED | OUTPATIENT
Start: 2025-08-19

## 2025-08-25 ENCOUNTER — TREATMENT (OUTPATIENT)
Dept: PHYSICAL THERAPY | Facility: CLINIC | Age: 70
End: 2025-08-25
Payer: COMMERCIAL

## 2025-08-25 DIAGNOSIS — M25.552 LEFT HIP PAIN: Primary | ICD-10-CM

## 2025-08-25 DIAGNOSIS — R29.898 WEAKNESS OF LEFT HIP: ICD-10-CM

## 2025-08-25 DIAGNOSIS — Z74.09 IMPAIRED FUNCTIONAL MOBILITY AND ACTIVITY TOLERANCE: ICD-10-CM

## 2025-08-25 DIAGNOSIS — M54.50 LUMBAR PAIN: ICD-10-CM

## 2025-08-25 PROCEDURE — 97110 THERAPEUTIC EXERCISES: CPT | Performed by: PHYSICAL THERAPIST

## 2025-08-25 PROCEDURE — 97530 THERAPEUTIC ACTIVITIES: CPT | Performed by: PHYSICAL THERAPIST

## 2025-08-25 PROCEDURE — 97112 NEUROMUSCULAR REEDUCATION: CPT | Performed by: PHYSICAL THERAPIST

## 2025-08-25 PROCEDURE — 97162 PT EVAL MOD COMPLEX 30 MIN: CPT | Performed by: PHYSICAL THERAPIST

## (undated) DEVICE — KT ORCA ORCAPOD DISP STRL

## (undated) DEVICE — FLEX ADVANTAGE 1500CC: Brand: FLEX ADVANTAGE

## (undated) DEVICE — ADAPT CLN SCPE ENDO PORPOISE BX/50 DISP

## (undated) DEVICE — Device

## (undated) DEVICE — CANN O2 ETCO2 FITS ALL CONN CO2 SMPL A/ 7IN DISP LF

## (undated) DEVICE — GOWN PROC ENDOARMOR GI LVL3 HY/SHLD UNIV